# Patient Record
Sex: FEMALE | Race: OTHER | HISPANIC OR LATINO | Employment: FULL TIME | URBAN - METROPOLITAN AREA
[De-identification: names, ages, dates, MRNs, and addresses within clinical notes are randomized per-mention and may not be internally consistent; named-entity substitution may affect disease eponyms.]

---

## 2017-03-29 ENCOUNTER — ALLSCRIPTS OFFICE VISIT (OUTPATIENT)
Dept: OTHER | Facility: OTHER | Age: 47
End: 2017-03-29

## 2017-03-29 ENCOUNTER — LAB REQUISITION (OUTPATIENT)
Dept: LAB | Facility: HOSPITAL | Age: 47
End: 2017-03-29

## 2017-03-29 DIAGNOSIS — Z01.419 ENCOUNTER FOR GYNECOLOGICAL EXAMINATION WITHOUT ABNORMAL FINDING: ICD-10-CM

## 2017-03-29 DIAGNOSIS — Z12.31 ENCOUNTER FOR SCREENING MAMMOGRAM FOR MALIGNANT NEOPLASM OF BREAST: ICD-10-CM

## 2017-03-29 PROCEDURE — G0145 SCR C/V CYTO,THINLAYER,RESCR: HCPCS | Performed by: PHYSICIAN ASSISTANT

## 2017-03-29 PROCEDURE — 87624 HPV HI-RISK TYP POOLED RSLT: CPT | Performed by: PHYSICIAN ASSISTANT

## 2017-04-03 LAB — HPV RRNA GENITAL QL NAA+PROBE: NORMAL

## 2017-04-04 LAB
LAB AP GYN PRIMARY INTERPRETATION: NORMAL
Lab: NORMAL

## 2017-10-24 ENCOUNTER — ALLSCRIPTS OFFICE VISIT (OUTPATIENT)
Dept: OTHER | Facility: OTHER | Age: 47
End: 2017-10-24

## 2017-10-24 DIAGNOSIS — H53.8 OTHER VISUAL DISTURBANCES (CODE): ICD-10-CM

## 2017-10-24 DIAGNOSIS — Z13.220 ENCOUNTER FOR SCREENING FOR LIPOID DISORDERS: ICD-10-CM

## 2017-10-24 DIAGNOSIS — N64.4 MASTODYNIA: ICD-10-CM

## 2017-11-14 ENCOUNTER — APPOINTMENT (OUTPATIENT)
Dept: LAB | Facility: CLINIC | Age: 47
End: 2017-11-14
Payer: COMMERCIAL

## 2017-11-14 ENCOUNTER — TRANSCRIBE ORDERS (OUTPATIENT)
Dept: LAB | Facility: CLINIC | Age: 47
End: 2017-11-14

## 2017-11-14 ENCOUNTER — ALLSCRIPTS OFFICE VISIT (OUTPATIENT)
Dept: OTHER | Facility: OTHER | Age: 47
End: 2017-11-14

## 2017-11-14 DIAGNOSIS — Z13.220 ENCOUNTER FOR SCREENING FOR LIPOID DISORDERS: ICD-10-CM

## 2017-11-14 DIAGNOSIS — H53.8 OTHER VISUAL DISTURBANCES (CODE): ICD-10-CM

## 2017-11-14 DIAGNOSIS — N64.4 MASTODYNIA: ICD-10-CM

## 2017-11-14 LAB
ALBUMIN SERPL BCP-MCNC: 3.5 G/DL (ref 3.5–5)
ALP SERPL-CCNC: 65 U/L (ref 46–116)
ALT SERPL W P-5'-P-CCNC: 37 U/L (ref 12–78)
ANION GAP SERPL CALCULATED.3IONS-SCNC: 6 MMOL/L (ref 4–13)
AST SERPL W P-5'-P-CCNC: 18 U/L (ref 5–45)
BASOPHILS # BLD AUTO: 0.05 THOUSANDS/ΜL (ref 0–0.1)
BASOPHILS NFR BLD AUTO: 1 % (ref 0–1)
BILIRUB SERPL-MCNC: 0.63 MG/DL (ref 0.2–1)
BUN SERPL-MCNC: 12 MG/DL (ref 5–25)
CALCIUM SERPL-MCNC: 8.7 MG/DL (ref 8.3–10.1)
CHLORIDE SERPL-SCNC: 102 MMOL/L (ref 100–108)
CHOLEST SERPL-MCNC: 187 MG/DL (ref 50–200)
CO2 SERPL-SCNC: 28 MMOL/L (ref 21–32)
CREAT SERPL-MCNC: 0.56 MG/DL (ref 0.6–1.3)
EOSINOPHIL # BLD AUTO: 0.4 THOUSAND/ΜL (ref 0–0.61)
EOSINOPHIL NFR BLD AUTO: 5 % (ref 0–6)
ERYTHROCYTE [DISTWIDTH] IN BLOOD BY AUTOMATED COUNT: 13.2 % (ref 11.6–15.1)
EST. AVERAGE GLUCOSE BLD GHB EST-MCNC: 100 MG/DL
GFR SERPL CREATININE-BSD FRML MDRD: 111 ML/MIN/1.73SQ M
GLUCOSE P FAST SERPL-MCNC: 81 MG/DL (ref 65–99)
HBA1C MFR BLD: 5.1 % (ref 4.2–6.3)
HCT VFR BLD AUTO: 40.2 % (ref 34.8–46.1)
HDLC SERPL-MCNC: 73 MG/DL (ref 40–60)
HGB BLD-MCNC: 13.5 G/DL (ref 11.5–15.4)
LDLC SERPL CALC-MCNC: 80 MG/DL (ref 0–100)
LYMPHOCYTES # BLD AUTO: 1.91 THOUSANDS/ΜL (ref 0.6–4.47)
LYMPHOCYTES NFR BLD AUTO: 26 % (ref 14–44)
MCH RBC QN AUTO: 30.8 PG (ref 26.8–34.3)
MCHC RBC AUTO-ENTMCNC: 33.6 G/DL (ref 31.4–37.4)
MCV RBC AUTO: 92 FL (ref 82–98)
MONOCYTES # BLD AUTO: 0.49 THOUSAND/ΜL (ref 0.17–1.22)
MONOCYTES NFR BLD AUTO: 7 % (ref 4–12)
NEUTROPHILS # BLD AUTO: 4.54 THOUSANDS/ΜL (ref 1.85–7.62)
NEUTS SEG NFR BLD AUTO: 61 % (ref 43–75)
NRBC BLD AUTO-RTO: 0 /100 WBCS
PLATELET # BLD AUTO: 207 THOUSANDS/UL (ref 149–390)
PMV BLD AUTO: 11.1 FL (ref 8.9–12.7)
POTASSIUM SERPL-SCNC: 4.1 MMOL/L (ref 3.5–5.3)
PROT SERPL-MCNC: 7.2 G/DL (ref 6.4–8.2)
RBC # BLD AUTO: 4.39 MILLION/UL (ref 3.81–5.12)
SODIUM SERPL-SCNC: 136 MMOL/L (ref 136–145)
TRIGL SERPL-MCNC: 172 MG/DL
TSH SERPL DL<=0.05 MIU/L-ACNC: 3.49 UIU/ML (ref 0.36–3.74)
WBC # BLD AUTO: 7.42 THOUSAND/UL (ref 4.31–10.16)

## 2017-11-14 PROCEDURE — 84443 ASSAY THYROID STIM HORMONE: CPT

## 2017-11-14 PROCEDURE — 83036 HEMOGLOBIN GLYCOSYLATED A1C: CPT

## 2017-11-14 PROCEDURE — 80061 LIPID PANEL: CPT

## 2017-11-14 PROCEDURE — 36415 COLL VENOUS BLD VENIPUNCTURE: CPT

## 2017-11-14 PROCEDURE — 85025 COMPLETE CBC W/AUTO DIFF WBC: CPT

## 2017-11-14 PROCEDURE — 80053 COMPREHEN METABOLIC PANEL: CPT

## 2017-11-16 ENCOUNTER — GENERIC CONVERSION - ENCOUNTER (OUTPATIENT)
Dept: OTHER | Facility: OTHER | Age: 47
End: 2017-11-16

## 2017-12-20 ENCOUNTER — HOSPITAL ENCOUNTER (OUTPATIENT)
Dept: MAMMOGRAPHY | Facility: CLINIC | Age: 47
Discharge: HOME | End: 2017-12-20
Payer: COMMERCIAL

## 2017-12-20 ENCOUNTER — GENERIC CONVERSION - ENCOUNTER (OUTPATIENT)
Dept: OTHER | Facility: OTHER | Age: 47
End: 2017-12-20

## 2017-12-20 ENCOUNTER — HOSPITAL ENCOUNTER (OUTPATIENT)
Dept: ULTRASOUND IMAGING | Facility: CLINIC | Age: 47
Discharge: HOME | End: 2017-12-20
Payer: COMMERCIAL

## 2017-12-20 DIAGNOSIS — N64.4 BREAST PAIN: ICD-10-CM

## 2017-12-20 DIAGNOSIS — N64.4 MASTODYNIA: ICD-10-CM

## 2017-12-20 PROCEDURE — G0279 TOMOSYNTHESIS, MAMMO: HCPCS

## 2017-12-20 PROCEDURE — G0204 DX MAMMO INCL CAD BI: HCPCS

## 2017-12-21 ENCOUNTER — GENERIC CONVERSION - ENCOUNTER (OUTPATIENT)
Dept: OTHER | Facility: OTHER | Age: 47
End: 2017-12-21

## 2017-12-21 DIAGNOSIS — R92.8 OTHER ABNORMAL AND INCONCLUSIVE FINDINGS ON DIAGNOSTIC IMAGING OF BREAST: ICD-10-CM

## 2017-12-22 ENCOUNTER — GENERIC CONVERSION - ENCOUNTER (OUTPATIENT)
Dept: OTHER | Facility: OTHER | Age: 47
End: 2017-12-22

## 2017-12-27 ENCOUNTER — HOSPITAL ENCOUNTER (OUTPATIENT)
Dept: ULTRASOUND IMAGING | Facility: CLINIC | Age: 47
Discharge: HOME | End: 2017-12-27
Payer: COMMERCIAL

## 2017-12-27 DIAGNOSIS — R92.8 OTHER ABNORMAL AND INCONCLUSIVE FINDINGS ON DIAGNOSTIC IMAGING OF BREAST: ICD-10-CM

## 2017-12-27 PROCEDURE — 76642 ULTRASOUND BREAST LIMITED: CPT

## 2017-12-28 ENCOUNTER — GENERIC CONVERSION - ENCOUNTER (OUTPATIENT)
Dept: OTHER | Facility: OTHER | Age: 47
End: 2017-12-28

## 2018-01-10 NOTE — PROGRESS NOTES
Assessment    1  Well adult on routine health check (V70 0) (Z00 00)    Plan  Well adult on routine health check    · Follow-up visit in 1 year Evaluation and Treatment  Follow-up  Status: Hold For -  Scheduling  Requested for: [id number]    Discussion/Summary  health maintenance visit Currently, she eats a healthy diet  cervical cancer screening is current Breast cancer screening: Patient states that she does have the requisition for mammogram  Encouraged to do so since there is a family history with an aunt with breast cancer  Patient discussion: discussed with the patient  Form for [employer] completed and photocopied  Encouraged to have her mammogram ordered by gynecology  She is proceeding with fasting blood work today ordered previously  Follow-up if abdominal pain ( only on exam) persist    The patient was counseled regarding instructions for management, impressions  The treatment plan was reviewed with the patient/guardian  The patient/guardian understands and agrees with the treatment plan     Self Referrals: No      Chief Complaint  pt here today for a physical for work      History of Present Illness  HM, Adult Female: The patient is being seen for a health maintenance and Patient states she needs a routine physical for preemployment for [employer]  Applying for a  position evaluation  General Health: The patient's health since the last visit is described as good  She has regular dental visits  The patient reports her last dental visit was 2017  She denies vision problems  She denies hearing loss  Lifestyle:  She consumes a diverse and healthy diet  She does not have any weight concerns  She does not exercise regularly  She does not use tobacco  She denies alcohol use  She denies drug use  Reproductive health:  she reports normal menses  saw gyne 3/2017     Screening:      Review of Systems    Constitutional: No fever, no chills, feels well, no tiredness, no recent weight gain or weight loss    ENT: no complaints of earache, no loss of hearing, no nose bleeds, no nasal discharge, no sore throat, no hoarseness  Cardiovascular: No complaints of slow heart rate, no fast heart rate, no chest pain, no palpitations, no leg claudication, no lower extremity edema  Respiratory: No complaints of shortness of breath, no wheezing, no cough, no SOB on exertion, no orthopnea, no PND  Gastrointestinal: no abdominal pain, no nausea and no vomiting  Active Problems    1  Blurred vision (368 8) (H53 8)   2  Contraception management (V25 9) (Z30 9)   3  Diverticulitis of colon (562 11) (K57 32)   4  Encounter for management and injection of depo-Provera (V25 49) (Z30 42)   5  Encounter for routine gynecological examination (V72 31) (Z01 419)   6  History of spontaneous  (V13 29) (Z87 59)   7  Lipid screening (V77 91) (Z13 220)   8  Nexplanon removal (V25 43) (Z30 46)   9  Pain of left breast (611 71) (N64 4)   10  Pap test, as part of routine gynecological examination (V76 2) (Z01 419)   11  Visit for screening mammogram (V76 12) (Z12 31)    Past Medical History    · History of spontaneous  (V13 29) (Z87 59)    Surgical History    · History of Exploratory Laparotomy    Family History  Mother    · Family history of Hypertension  Paternal Grandfather    · Family history of diabetes mellitus (V18 0) (Z83 3)   · Family history of malignant neoplasm of prostate (V16 42) (Z80 42)  Maternal Aunt    · Family history of diabetes mellitus (V18 0) (Z83 3)   · Family history of malignant neoplasm of breast (V16 3) (Z80 3)   · Family history of malignant neoplasm of prostate (V16 42) (Z80 45)  Family History    · Family history of diabetes mellitus (V18 0) (Z83 3)    Social History    · Denied: History of Alcohol use   · Denied: History of Drug use   · Never a smoker    Current Meds   1  Multivital TABS; Therapy: (Recorded:2017) to Recorded   2  Tylenol 325 MG Oral Tablet;    Therapy: (Recorded:26Mar2015) to Recorded    Allergies    1  No Known Drug Allergies    2  No Known Environmental Allergies   3  No Known Food Allergies    Vitals   Recorded: [id number] 08:08AM   Temperature 96 3 F, Tympanic   Heart Rate 92   Respiration 18   Systolic 628, RUE, Sitting   Diastolic 88, RUE, Sitting   Height 4 ft 11 5 in   Weight 133 lb 8 oz   BMI Calculated 26 51   BSA Calculated 1 56   O2 Saturation 99     Physical Exam    Constitutional   General appearance: No acute distress, well appearing and well nourished  Ears, Nose, Mouth, and Throat   External inspection of ears and nose: Normal     Otoscopic examination: Tympanic membranes translucent with normal light reflex  Canals patent without erythema  Lips, teeth, and gums: Normal, good dentition  Oropharynx: Normal with no erythema, edema, exudate or lesions  Neck   Neck: Supple, symmetric, trachea midline, no masses  Thyroid: Normal, no thyromegaly  Pulmonary   Respiratory effort: No increased work of breathing or signs of respiratory distress  Auscultation of lungs: Clear to auscultation  Cardiovascular   Auscultation of heart: Normal rate and rhythm, normal S1 and S2, no murmurs  Abdomen   Abdomen: Abnormal   Good bowel sounds, soft, mild tenderness of the upper quadrants the patient denies any pain with eating  Liver and spleen: No hepatomegaly or splenomegaly  Lymphatic   Palpation of lymph nodes in neck: No lymphadenopathy      Musculoskeletal   Gait and station: Normal        Signatures   Electronically signed by : LILY Coker; Nov 14 2017  8:26AM EST                       (Author)    Electronically signed by : KAR Wan ; Nov 14 2017 10:19AM EST

## 2018-01-12 VITALS
BODY MASS INDEX: 26.21 KG/M2 | WEIGHT: 133.5 LBS | RESPIRATION RATE: 18 BRPM | OXYGEN SATURATION: 99 % | HEART RATE: 92 BPM | SYSTOLIC BLOOD PRESSURE: 122 MMHG | TEMPERATURE: 96.3 F | HEIGHT: 60 IN | DIASTOLIC BLOOD PRESSURE: 88 MMHG

## 2018-01-15 VITALS
WEIGHT: 127.13 LBS | HEIGHT: 61 IN | BODY MASS INDEX: 24 KG/M2 | SYSTOLIC BLOOD PRESSURE: 118 MMHG | DIASTOLIC BLOOD PRESSURE: 81 MMHG

## 2018-01-22 VITALS
WEIGHT: 137 LBS | RESPIRATION RATE: 18 BRPM | SYSTOLIC BLOOD PRESSURE: 134 MMHG | TEMPERATURE: 98.5 F | HEART RATE: 90 BPM | HEIGHT: 60 IN | BODY MASS INDEX: 26.9 KG/M2 | DIASTOLIC BLOOD PRESSURE: 82 MMHG

## 2018-01-23 NOTE — RESULT NOTES
Verified Results  *US BREAST RIGHT LIMITED (DIAGNOSTIC) [id number] 07:52AM Slava Raygoza Order Number: [id number]    - Patient Instructions: To schedule this appointment, please contact Central Scheduling at [phone]  Test Name Result Flag Reference   US BREAST RIGHT LIMITED (Report)     Patient History:   Family history of breast cancer in maternal uncle  Patient has never smoked  Patient's BMI is 25 4  Reason for exam: clinical finding  Bilateral areas of PAIN being evaluated with ultrasound  Patient   could not stay for ultrasound at the time of recent diagnostic    mammogram  Indication was incorrectly dictated for palpable    abnormalities at the time of diagnostic mammography  US Breast Right Limited: December 27, 2017 - Check In #: [de-identified]   Standard views  Technologist: Radha Das RDMS   Prior study comparison: December 20, 2017, mammo diagnostic    bilateral W DBT and CAD performed at [address] [place] [address]  Targeted bilateral ultrasound demonstrates no evidence of    hypoechoic mass or architectural distortion to suggest    malignancy  No suspicious hypoechoic mass or architectural    distortion to suggest malignancy  US Breast Left Limited: December 27, 2017 - Check In #: [de-identified]   Standard views  Technologist: Radha Das RDMS     ACR BI-RADSï¾® Assessments: BiRad:1 - Negative (Overall)   Right breast Right Brst US: Right breast is BiRad:1 - negative  Left breast Left Brst US: Left breast is BiRad:1 - negative  Recommendation:   Routine screening mammogram of both breasts in 1 year  The patient is scheduled in a reminder system for screening    mammography       Transcription Location: [facility] 98: [id number]     Risk Value(s):   Tyrer-Cuzick 10 Year: 1 900%, Tyrer-Cuzick Lifetime: 9 200%,    Myriad Table: 1 5%, LEAH 5 Year: 0 6%, NCI Lifetime: 6 1%, MRS    : Based on personal and/or family history, consideration of hereditary risk assessment may be warranted  Signed by:   Raz Park MD   12/27/17     *US BREAST LEFT LIMITED (DIAGNOSTIC) [id number] 09:05AM Sheyla Sutton     Test Name Result Flag Reference   US BREAST LEFT LIMITED (Report)     Patient History:   Family history of breast cancer in maternal uncle  Patient has never smoked  Patient's BMI is 25 4  Reason for exam: clinical finding  Bilateral areas of PAIN being evaluated with ultrasound  Patient   could not stay for ultrasound at the time of recent diagnostic    mammogram  Indication was incorrectly dictated for palpable    abnormalities at the time of diagnostic mammography  US Breast Right Limited: December 27, 2017 - Check In #: [de-identified]   Standard views  Technologist: Pia Baeza RDMS   Prior study comparison: December 20, 2017, mammo diagnostic    bilateral W DBT and CAD performed at [address] [place] [address]  Targeted bilateral ultrasound demonstrates no evidence of    hypoechoic mass or architectural distortion to suggest    malignancy  No suspicious hypoechoic mass or architectural    distortion to suggest malignancy  US Breast Left Limited: December 27, 2017 - Check In #: [de-identified]   Standard views  Technologist: Pia Baeza RDMS     ACR BI-RADSï¾® Assessments: BiRad:1 - Negative (Overall)   Right breast Right Brst US: Right breast is BiRad:1 - negative  Left breast Left Brst US: Left breast is BiRad:1 - negative  Recommendation:   Routine screening mammogram of both breasts in 1 year  The patient is scheduled in a reminder system for screening    mammography       Transcription Location: [facility] 98: [id number]     Risk Value(s):   Tyrer-Cuzick 10 Year: 1 900%, Tyrer-Cuzick Lifetime: 9 200%,    Myriad Table: 1 5%, LEAH 5 Year: 0 6%, NCI Lifetime: 6 1%, MRS    : Based on personal and/or family history,    consideration of hereditary risk assessment may be warranted     Signed by:   Suki Long MD   12/27/17

## 2018-01-23 NOTE — RESULT NOTES
Verified Results  MAMMO DIAGNOSTIC BILATERAL W 3D & CAD [id number] 08:35AM Ernst Mandel Order Number: [id number]    - Patient Instructions: To schedule this appointment, please contact Central Scheduling at [phone]  Do not wear any perfume, powder, lotion or deodorant on breast or underarm area  Please bring your doctors order, referral (if needed) and insurance information with you on the day of the test  Failure to bring this information may result in this test being rescheduled  Arrive 15 minutes prior to your appointment time to register  On the day of your test, please bring any prior mammogram or breast studies with you that were not performed at a [facility]  Failure to bring prior exams may result in your test needing to be rescheduled  Test Name Result Flag Reference   [facility] DIAGNOSTIC BILATERAL W 3D & CAD (Report)     ADDENDUM:   Previous study from 2012 was made available to me  There is no    significant change in the dense parenchymal pattern  There is no    change to the findings or recommendations  Patient History:   Family history of breast cancer in maternal uncle  Patient has never smoked  Patient's BMI is 25 4  Reason for exam: clinical finding  Bilateral palpable abnormalities     Mammo Diagnostic Bilateral W DBT and CAD: December 20, 2017 -    Check In #: [de-identified]   2D/3D Procedure   3D views: Bilateral MLO view(s) were taken  2D views: Bilateral CC view(s) were taken  Technologist: MAYRA Estrada (R)(M)   The breast tissue is extremely dense, potentially limiting the    sensitivity of mammography  Patient risk, included in this    report, assists in determining the appropriate screening regimen    (such as 3-D mammography or the inclusion of automated breast    ultrasound or MRI)  3-D mammography may also remain indicated as    screening   These images were obtained using digital technique    and with the assistance of Computer Aided Detection  There are    no dominant masses, foci of architectural distortion or    suspicious clusters of calcification to suggest malignancy  The    visualized skin appears normal  The parenchymal pattern is dense    and nodular  Bilateral ultrasound over the palpable abnormalities will be    necessary for further evaluation  Patient could not stay for    ultrasound at this time  IMPRESSION:     ACR BI-RADSï¾® Assessments: BiRad:0 - Incomplete: needs additional    imaging evaluation     Recommendation:   Ultrasound and obtain prior study for comparison of both breasts  The patient is scheduled in a reminder system for screening    mammography  8-10% of cancers will be missed on mammography  Management of a    palpable abnormality must be based on clinical grounds  Patients   will be notified of their results via letter from our facility  Accredited by [employer] of [employer] and FDA  Transcription Location: [facility] 98: [id number]     Risk Value(s):   Tyrer-Cuzick 10 Year: 1 900%, Tyrer-Cuzick Lifetime: 9 200%,    Myriad Table: 1 5%, LEAH 5 Year: 0 6%, NCI Lifetime: 6 1%, MRS    : Based on personal and/or family history,    consideration of hereditary risk assessment may be warranted  Patient History:   Family history of breast cancer in maternal uncle  Patient has never smoked  Patient's BMI is 25 4  Reason for exam: clinical finding  Bilateral palpable abnormalities     Mammo Diagnostic Bilateral W DBT and CAD: December 20, 2017 -    Check In #: [de-identified]   2D/3D Procedure   3D views: Bilateral MLO view(s) were taken  2D views: Bilateral CC view(s) were taken  Technologist: MAYRA Kaur (R)(M)   The breast tissue is extremely dense, potentially limiting the    sensitivity of mammography   Patient risk, included in this    report, assists in determining the appropriate screening regimen    (such as 3-D mammography or the inclusion of automated breast    ultrasound or MRI)  3-D mammography may also remain indicated as    screening  These images were obtained using digital technique    and with the assistance of Computer Aided Detection  There are    no dominant masses, foci of architectural distortion or    suspicious clusters of calcification to suggest malignancy  The    visualized skin appears normal  The parenchymal pattern is dense    and nodular  Bilateral ultrasound over the palpable abnormalities will be    necessary for further evaluation  Patient could not stay for    ultrasound at this time  ACR BI-RADSï¾® Assessments: BiRad:0 - Incomplete: needs additional    imaging evaluation     Recommendation:   Ultrasound and obtain prior study for comparison of both breasts  The patient is scheduled in a reminder system for screening    mammography  8-10% of cancers will be missed on mammography  Management of a    palpable abnormality must be based on clinical grounds  Patients   will be notified of their results via letter from our facility  Accredited by [employer] and FDA  Transcription Location: [facility] 98: [id number]     Risk Value(s):   Tyrer-Cuzick 10 Year: 1 900%, Tyrer-Cuzick Lifetime: 9 200%,    Myriad Table: 1 5%, LEAH 5 Year: 0 6%, NCI Lifetime: 6 1%, MRS    : Based on personal and/or family history,    consideration of hereditary risk assessment may be warranted     Signed by:   Esperanza Sanders MD   12/20/17

## 2018-01-23 NOTE — RESULT NOTES
Verified Results  MAMMO DIAGNOSTIC BILATERAL W 3D & CAD [id number] 08:35AM Ester Le Order Number: [id number]    - Patient Instructions: To schedule this appointment, please contact Central Scheduling at [phone]  Do not wear any perfume, powder, lotion or deodorant on breast or underarm area  Please bring your doctors order, referral (if needed) and insurance information with you on the day of the test  Failure to bring this information may result in this test being rescheduled  Arrive 15 minutes prior to your appointment time to register  On the day of your test, please bring any prior mammogram or breast studies with you that were not performed at a [facility]  Failure to bring prior exams may result in your test needing to be rescheduled  Test Name Result Flag Reference   MAMMO DIAGNOSTIC BILATERAL W 3D & CAD (Report)     Patient History:   Family history of breast cancer in maternal uncle  Patient has never smoked  Patient's BMI is 25 4  Reason for exam: clinical finding  Bilateral palpable abnormalities     Mammo Diagnostic Bilateral W DBT and CAD: December 20, 2017 -    Check In #: [de-identified]   2D/3D Procedure   3D views: Bilateral MLO view(s) were taken  2D views: Bilateral CC view(s) were taken  Technologist: MAYRA Figueroa (MAYRA)(M)   The breast tissue is extremely dense, potentially limiting the    sensitivity of mammography  Patient risk, included in this    report, assists in determining the appropriate screening regimen    (such as 3-D mammography or the inclusion of automated breast    ultrasound or MRI)  3-D mammography may also remain indicated as    screening  These images were obtained using digital technique    and with the assistance of Computer Aided Detection  There are    no dominant masses, foci of architectural distortion or    suspicious clusters of calcification to suggest malignancy   The    visualized skin appears normal  The parenchymal pattern is dense    and nodular  Bilateral ultrasound over the palpable abnormalities will be    necessary for further evaluation  Patient could not stay for    ultrasound at this time  ACR BI-RADSï¾® Assessments: BiRad:0 - Incomplete: needs additional    imaging evaluation     Recommendation:   Ultrasound and obtain prior study for comparison of both breasts  The patient is scheduled in a reminder system for screening    mammography  8-10% of cancers will be missed on mammography  Management of a    palpable abnormality must be based on clinical grounds  Patients   will be notified of their results via letter from our facility  Accredited by [employer] and FDA  Transcription Location: [facility] 98: [id number]     Risk Value(s):   Tyrer-Cuzick 10 Year: 1 900%, Tyrer-Cuzick Lifetime: 9 200%,    Myriad Table: 1 5%, LEAH 5 Year: 0 6%, NCI Lifetime: 6 1%, MRS    : Based on personal and/or family history,    consideration of hereditary risk assessment may be warranted  Signed by:   Christy Mahoney MD   12/20/17       Plan  Abnormal mammogram of both breasts    · *US BREAST LEFT LIMITED (DIAGNOSTIC); Status:Hold For - Scheduling; Requested  for:[id number];    · *US BREAST RIGHT LIMITED (DIAGNOSTIC);  Status:Hold For - Scheduling; Requested  for:[id number];

## 2018-02-13 NOTE — RESULT NOTES
Verified Results  (1) CBC/PLT/DIFF [id number] 08:16AM Abhinav Marcos Order Number: [id number]     Test Name Result Flag Reference   WBC COUNT 7 42 Thousand/uL  4 31-10 16   RBC COUNT 4 39 Million/uL  3 81-5 12   HEMOGLOBIN 13 5 g/dL  11 5-15 4   HEMATOCRIT 40 2 %  34 8-46  1   MCV 92 fL  82-98   MCH 30 8 pg  26 8-34 3   MCHC 33 6 g/dL  31 4-37 4   RDW 13 2 %  11 6-15 1   MPV 11 1 fL  8 9-12 7   PLATELET COUNT 269 Thousands/uL  149-390   nRBC AUTOMATED 0 /100 WBCs     NEUTROPHILS RELATIVE PERCENT 61 %  43-75   LYMPHOCYTES RELATIVE PERCENT 26 %  14-44   MONOCYTES RELATIVE PERCENT 7 %  4-12   EOSINOPHILS RELATIVE PERCENT 5 %  0-6   BASOPHILS RELATIVE PERCENT 1 %  0-1   NEUTROPHILS ABSOLUTE COUNT 4 54 Thousands/? ??L  1 85-7 62   LYMPHOCYTES ABSOLUTE COUNT 1 91 Thousands/? ??L  0 60-4 47   MONOCYTES ABSOLUTE COUNT 0 49 Thousand/? ??L  0 17-1 22   EOSINOPHILS ABSOLUTE COUNT 0 40 Thousand/? ??L  0 00-0 61   BASOPHILS ABSOLUTE COUNT 0 05 Thousands/? ??L  0 00-0 10     (1) COMPREHENSIVE METABOLIC PANEL [id number] 59:31GS Abhinav Marcos Order Number: [id number]     Test Name Result Flag Reference   SODIUM 136 mmol/L  136-145   POTASSIUM 4 1 mmol/L  3 5-5 3   CHLORIDE 102 mmol/L  100-108   CARBON DIOXIDE 28 mmol/L  21-32   ANION GAP (CALC) 6 mmol/L  4-13   BLOOD UREA NITROGEN 12 mg/dL  5-25   CREATININE 0 56 mg/dL L 0 60-1 30   Standardized to IDMS reference method   CALCIUM 8 7 mg/dL  8 3-10 1   BILI, TOTAL 0 63 mg/dL  0 20-1 00   ALK PHOSPHATAS 65 U/L     ALT (SGPT) 37 U/L  12-78   Specimen collection should occur prior to Sulfasalazine and/or Sulfapyridine administration due to the potential for falsely depressed results  AST(SGOT) 18 U/L  5-45   Specimen collection should occur prior to Sulfasalazine administration due to the potential for falsely depressed results     ALBUMIN 3 5 g/dL  3 5-5 0   TOTAL PROTEIN 7 2 g/dL  6 4-8 2   eGFR 111 ml/min/1 73sq m     National Kidney Disease Education Program recommendations are as follows:  GFR calculation is accurate only with a steady state creatinine  Chronic Kidney disease less than 60 ml/min/1 73 sq  meters  Kidney failure less than 15 ml/min/1 73 sq  meters  GLUCOSE FASTING 81 mg/dL  65-99   Specimen collection should occur prior to Sulfasalazine administration due to the potential for falsely depressed results  Specimen collection should occur prior to Sulfapyridine administration due to the potential for falsely elevated results  (1) LIPID PANEL, FASTING [id number] 08:16AM [facility] Order Number: [id number]     Test Name Result Flag Reference   CHOLESTEROL 187 mg/dL     HDL,DIRECT 73 mg/dL H 40-60   Specimen collection should occur prior to Metamizole administration due to the potential for falsley depressed results  LDL CHOLESTEROL CALCULATED 80 mg/dL  0-100   Triglyceride:        Normal <150 mg/dl   Borderline High 150-199 mg/dl   High 200-499 mg/dl   Very High >499 mg/dl      Cholesterol:       Desirable <200 mg/dl    Borderline High 200-239 mg/dl    High >239 mg/dl      HDL Cholesterol:       High>59 mg/dL    Low <41 mg/dL      This screening LDL is a calculated result  It does not have the accuracy of the Direct Measured LDL in the monitoring of patients with hyperlipidemia and/or statin therapy  Direct Measure LDL (BQW748) must be ordered separately in these patients  TRIGLYCERIDES 172 mg/dL H <=150   Specimen collection should occur prior to N-Acetylcysteine or Metamizole administration due to the potential for falsely depressed results  (1) TSH WITH FT4 REFLEX [id number] 08:16AM [facility] Order Number: [id number]     Test Name Result Flag Reference   TSH 3 490 uIU/mL  0 358-3 740   Patients undergoing fluorescein dye angiography may retain small amounts of fluorescein in the body for 48-72 hours post procedure  Samples containing fluorescein can produce falsely depressed TSH values   If the patient had this procedure,a specimen should be resubmitted post fluorescein clearance  The recommended reference ranges for TSH during pregnancy are as follows:  First trimester 0 1 to 2 5 uIU/mL  Second trimester  0 2 to 3 0 uIU/mL  Third trimester 0 3 to 3 0 uIU/m     (1) HEMOGLOBIN A1C [id number] 08:16AM [facility] Order Number: [id number]     Test Name Result Flag Reference   HEMOGLOBIN A1C 5 1 %  4 2-6 3   EST  AVG   GLUCOSE 100 mg/dl

## 2018-11-13 PROBLEM — R92.8 ABNORMAL MAMMOGRAM OF BOTH BREASTS: Status: ACTIVE | Noted: 2017-12-21

## 2018-11-13 PROBLEM — K57.30 DIVERTICULOSIS OF LARGE INTESTINE: Status: ACTIVE | Noted: 2018-11-13

## 2018-11-13 PROBLEM — N64.4 PAIN OF LEFT BREAST: Status: ACTIVE | Noted: 2017-10-24

## 2018-11-13 PROBLEM — K57.32 DIVERTICULITIS OF COLON: Status: ACTIVE | Noted: 2017-10-24

## 2018-11-13 PROBLEM — K57.32 DIVERTICULITIS OF COLON: Status: RESOLVED | Noted: 2017-10-24 | Resolved: 2018-11-13

## 2018-11-13 PROBLEM — Z87.19 HISTORY OF DIVERTICULITIS: Status: ACTIVE | Noted: 2018-11-13

## 2018-12-07 PROBLEM — Z00.00 WELL ADULT EXAM: Status: ACTIVE | Noted: 2018-12-07

## 2018-12-11 ENCOUNTER — OFFICE VISIT (OUTPATIENT)
Dept: FAMILY MEDICINE CLINIC | Facility: CLINIC | Age: 48
End: 2018-12-11
Payer: COMMERCIAL

## 2018-12-11 VITALS
BODY MASS INDEX: 26.81 KG/M2 | WEIGHT: 136.56 LBS | RESPIRATION RATE: 18 BRPM | OXYGEN SATURATION: 99 % | TEMPERATURE: 97.6 F | HEIGHT: 60 IN | HEART RATE: 76 BPM | DIASTOLIC BLOOD PRESSURE: 78 MMHG | SYSTOLIC BLOOD PRESSURE: 128 MMHG

## 2018-12-11 DIAGNOSIS — J06.9 ACUTE UPPER RESPIRATORY INFECTION: Primary | ICD-10-CM

## 2018-12-11 DIAGNOSIS — E78.1 PRIMARY HYPERTRIGLYCERIDEMIA: ICD-10-CM

## 2018-12-11 PROCEDURE — 3008F BODY MASS INDEX DOCD: CPT | Performed by: PHYSICIAN ASSISTANT

## 2018-12-11 PROCEDURE — 99051 MED SERV EVE/WKEND/HOLIDAY: CPT | Performed by: PHYSICIAN ASSISTANT

## 2018-12-11 PROCEDURE — 1036F TOBACCO NON-USER: CPT | Performed by: PHYSICIAN ASSISTANT

## 2018-12-11 PROCEDURE — 99214 OFFICE O/P EST MOD 30 MIN: CPT | Performed by: PHYSICIAN ASSISTANT

## 2018-12-11 RX ORDER — PROMETHAZINE HYDROCHLORIDE AND CODEINE PHOSPHATE 6.25; 1 MG/5ML; MG/5ML
5 SYRUP ORAL EVERY 4 HOURS PRN
Qty: 120 ML | Refills: 0 | Status: SHIPPED | OUTPATIENT
Start: 2018-12-11 | End: 2019-10-16 | Stop reason: ALTCHOICE

## 2018-12-11 NOTE — PROGRESS NOTES
Assessment/Plan:    Patient Instructions   Recommend promethazine with codeine 1 tsp every 4-6 hours as needed but avoid the medication of working or driving because the drowsy side effects  Increase clear liquids  Follow-up if symptoms are not improved over the next week or the increase    Proceed with fasting lipid panel and CMP  Routine follow-up pending lab results    M*M[employer] software was used to dictate this note  It may contain errors with dictating incorrect words/spelling  Please contact provider directly for any questions  Diagnoses and all orders for this visit:    Acute upper respiratory infection  -     promethazine-codeine (PHENERGAN WITH CODEINE) 6 25-10 mg/5 mL syrup; Take 5 mL by mouth every 4 (four) hours as needed for cough    Primary hypertriglyceridemia  -     Comprehensive metabolic panel  -     Lipid panel          Subjective:      Patient ID: Yasmeen Moura is a 51 y o  female  Patient presents today for a routine evaluation  She states that she has had some nasal congestion and a cough over the past 2 days  The cough is keeping her awake at night  Denies any known fevers or chills  She has only been drinking tea for her symptoms  She has not been taking any cold supplements  She states that she has been taking ibuprofen also as needed for her headache  She has no other concerns at this time  She states her last Pap smear was a year ago  The following portions of the patient's history were reviewed and updated as appropriate:   She  has a past medical history of Spontaneous     She   Patient Active Problem List    Diagnosis Date Noted    Acute upper respiratory infection 2018    Primary hypertriglyceridemia 2018    Well adult exam 2018    Diverticulosis of large intestine 2018    History of diverticulitis 2018    Abnormal mammogram of both breasts 2017    Pain of left breast 10/24/2017     She  has a past surgical history that includes Exploratory laparotomy  Her family history includes Breast cancer in her maternal aunt; Diabetes in her maternal aunt and paternal grandfather; Hypertension in her mother; Prostate cancer in her paternal grandfather  She  reports that she has never smoked  She has never used smokeless tobacco  She reports that she does not drink alcohol or use drugs  Current Outpatient Prescriptions   Medication Sig Dispense Refill    acetaminophen (TYLENOL) 325 mg tablet Take by mouth      Multiple Vitamins-Minerals (MULTIVITAL-M PO) Take by mouth      promethazine-codeine (PHENERGAN WITH CODEINE) 6 25-10 mg/5 mL syrup Take 5 mL by mouth every 4 (four) hours as needed for cough 120 mL 0     No current facility-administered medications for this visit  Current Outpatient Prescriptions on File Prior to Visit   Medication Sig    acetaminophen (TYLENOL) 325 mg tablet Take by mouth    Multiple Vitamins-Minerals (MULTIVITAL-M PO) Take by mouth     No current facility-administered medications on file prior to visit  She has No Known Allergies       Review of Systems   Constitutional: Negative for chills and fever  HENT: Positive for congestion  Respiratory: Positive for cough  Neurological: Positive for headaches  Psychiatric/Behavioral: Positive for sleep disturbance  Objective:      /78 (BP Location: Right arm, Patient Position: Sitting, Cuff Size: Standard)   Pulse 76   Temp 97 6 °F (36 4 °C) (Tympanic)   Resp 18   Ht 4' 11 5" (1 511 m)   Wt 61 9 kg (136 lb 9 oz)   LMP 1969   SpO2 99%   BMI 27 12 kg/m²          Physical Exam   Constitutional: She appears well-developed and well-nourished  No distress  HENT:   Head: Normocephalic and atraumatic  Right Ear: External ear normal    Left Ear: External ear normal    Mouth/Throat: Oropharynx is clear and moist    Eyes: Pupils are equal, round, and reactive to light  Neck: Neck supple  No thyromegaly present  Cardiovascular: Normal rate, regular rhythm and normal heart sounds  Exam reveals no gallop and no friction rub  No murmur heard  Pulmonary/Chest: Effort normal and breath sounds normal  No respiratory distress  She has no wheezes  She has no rales  Abdominal: Soft  Bowel sounds are normal  She exhibits no mass  There is no tenderness  Musculoskeletal: Normal range of motion  She exhibits no deformity  Lymphadenopathy:     She has no cervical adenopathy  Neurological: She is alert  Skin: Skin is warm  Psychiatric: She has a normal mood and affect

## 2018-12-11 NOTE — PATIENT INSTRUCTIONS
Recommend promethazine with codeine 1 tsp every 4-6 hours as needed but avoid the medication of working or driving because the drowsy side effects    Increase clear liquids  Follow-up if symptoms are not improved over the next week or the increase    Proceed with fasting lipid panel and CMP  Routine follow-up pending lab results

## 2019-01-09 ENCOUNTER — APPOINTMENT (OUTPATIENT)
Dept: LAB | Facility: CLINIC | Age: 49
End: 2019-01-09
Payer: COMMERCIAL

## 2019-01-09 ENCOUNTER — TRANSCRIBE ORDERS (OUTPATIENT)
Dept: LAB | Facility: CLINIC | Age: 49
End: 2019-01-09

## 2019-01-09 LAB
ALBUMIN SERPL BCP-MCNC: 3.7 G/DL (ref 3.5–5)
ALP SERPL-CCNC: 63 U/L (ref 46–116)
ALT SERPL W P-5'-P-CCNC: 34 U/L (ref 12–78)
ANION GAP SERPL CALCULATED.3IONS-SCNC: 5 MMOL/L (ref 4–13)
AST SERPL W P-5'-P-CCNC: 16 U/L (ref 5–45)
BILIRUB SERPL-MCNC: 0.53 MG/DL (ref 0.2–1)
BUN SERPL-MCNC: 15 MG/DL (ref 5–25)
CALCIUM SERPL-MCNC: 8.5 MG/DL (ref 8.3–10.1)
CHLORIDE SERPL-SCNC: 103 MMOL/L (ref 100–108)
CHOLEST SERPL-MCNC: 227 MG/DL (ref 50–200)
CO2 SERPL-SCNC: 26 MMOL/L (ref 21–32)
CREAT SERPL-MCNC: 0.6 MG/DL (ref 0.6–1.3)
GFR SERPL CREATININE-BSD FRML MDRD: 107 ML/MIN/1.73SQ M
GLUCOSE P FAST SERPL-MCNC: 86 MG/DL (ref 65–99)
HDLC SERPL-MCNC: 82 MG/DL (ref 40–60)
LDLC SERPL CALC-MCNC: 108 MG/DL (ref 0–100)
NONHDLC SERPL-MCNC: 145 MG/DL
POTASSIUM SERPL-SCNC: 4.2 MMOL/L (ref 3.5–5.3)
PROT SERPL-MCNC: 7.4 G/DL (ref 6.4–8.2)
SODIUM SERPL-SCNC: 134 MMOL/L (ref 136–145)
TRIGL SERPL-MCNC: 186 MG/DL

## 2019-01-09 PROCEDURE — 80053 COMPREHEN METABOLIC PANEL: CPT | Performed by: PHYSICIAN ASSISTANT

## 2019-01-09 PROCEDURE — 36415 COLL VENOUS BLD VENIPUNCTURE: CPT | Performed by: PHYSICIAN ASSISTANT

## 2019-01-09 PROCEDURE — 80061 LIPID PANEL: CPT | Performed by: PHYSICIAN ASSISTANT

## 2019-01-11 ENCOUNTER — TELEPHONE (OUTPATIENT)
Dept: FAMILY MEDICINE CLINIC | Facility: CLINIC | Age: 49
End: 2019-01-11

## 2019-01-11 DIAGNOSIS — Z12.39 BREAST CANCER SCREENING: Primary | ICD-10-CM

## 2019-01-16 ENCOUNTER — OFFICE VISIT (OUTPATIENT)
Dept: OBGYN CLINIC | Facility: CLINIC | Age: 49
End: 2019-01-16

## 2019-01-16 VITALS
HEIGHT: 60 IN | DIASTOLIC BLOOD PRESSURE: 83 MMHG | BODY MASS INDEX: 25.91 KG/M2 | WEIGHT: 132 LBS | SYSTOLIC BLOOD PRESSURE: 128 MMHG | HEART RATE: 72 BPM

## 2019-01-16 DIAGNOSIS — Z12.39 BREAST CANCER SCREENING: Primary | ICD-10-CM

## 2019-01-16 DIAGNOSIS — Z00.00 WELL ADULT EXAM: ICD-10-CM

## 2019-01-16 PROCEDURE — 99214 OFFICE O/P EST MOD 30 MIN: CPT | Performed by: OBSTETRICS & GYNECOLOGY

## 2019-01-16 NOTE — PROGRESS NOTES
Assessment/Plan:      Diagnoses and all orders for this visit:    Breast cancer screening  Mammogram ordered, by PCP, f u results     Well adult exam  Up to date pap w/ cotesting WNL in 2017  Normal pelvic exam today   Dyslipidemia being treated with exercise and diet by PCP  Declines STD testing   Mammogram already ordered by PCP for this year, last year had bilateral breast US secondary to dense breast tissue, findings WNL  Taking OCPS for [facility], satisfied with it, will continue  Still having regular periods with LMP Dec 20th  Subjective:  Doing well, denies any complaints today  Patient ID: Kenya Saravia is a 51 y o  female  HPI  49yo female P3 here for annual exam  She has had good preventive care with her PCP  Patient denies any complaints today, she is sexually active with 1 male partner and uses OCPs for [facility]  She denies smoking , alcohol intake or drug use  Declines STD testing  LMP Dec 20th with regular cycles  She has mild dyslipidemia being treated with exercise and diet  Otherwise healthy  Review of Systems   Constitutional: Negative  HENT: Negative  Respiratory: Negative  Cardiovascular: Negative  Gastrointestinal: Negative  Genitourinary: Negative  Musculoskeletal: Negative  All other systems reviewed and are negative  Objective:     Physical Exam   Constitutional: She appears well-developed and well-nourished  No distress  HENT:   Head: Normocephalic and atraumatic  Neck: Normal range of motion  Neck supple  No JVD present  No thyromegaly present  Cardiovascular: Normal rate, regular rhythm and normal heart sounds  Exam reveals no gallop  No murmur heard  Pulmonary/Chest: Effort normal and breath sounds normal  No respiratory distress  She has no wheezes  She has no rales  Abdominal: Soft  Bowel sounds are normal  She exhibits no distension and no mass  There is no tenderness  There is no rebound and no guarding     Genitourinary: Vagina normal and uterus normal    Genitourinary Comments: Normal appearing cervix no masses observed, normal sized anteverted uterus, no adnexal masses palpated  Skin: She is not diaphoretic         /83   Pulse 72   Ht 5' (1 524 m)   Wt 59 9 kg (132 lb)   LMP 12/22/2018 (Approximate)   BMI 25 78 kg/m²

## 2019-10-16 ENCOUNTER — OFFICE VISIT (OUTPATIENT)
Dept: OBGYN CLINIC | Facility: CLINIC | Age: 49
End: 2019-10-16

## 2019-10-16 VITALS
HEART RATE: 81 BPM | SYSTOLIC BLOOD PRESSURE: 116 MMHG | HEIGHT: 60 IN | BODY MASS INDEX: 26.31 KG/M2 | WEIGHT: 134 LBS | DIASTOLIC BLOOD PRESSURE: 87 MMHG

## 2019-10-16 DIAGNOSIS — R10.2 PELVIC PAIN: Primary | ICD-10-CM

## 2019-10-16 LAB — SL AMB POCT URINE HCG: NEGATIVE

## 2019-10-16 PROCEDURE — 81025 URINE PREGNANCY TEST: CPT | Performed by: OBSTETRICS & GYNECOLOGY

## 2019-10-16 PROCEDURE — 99213 OFFICE O/P EST LOW 20 MIN: CPT | Performed by: OBSTETRICS & GYNECOLOGY

## 2019-10-16 RX ORDER — ACETAMINOPHEN 325 MG/1
650 TABLET ORAL EVERY 6 HOURS PRN
COMMUNITY
End: 2022-02-11 | Stop reason: ALTCHOICE

## 2019-10-16 NOTE — PROGRESS NOTES
Assessment/Plan:    No problem-specific Assessment & Plan notes found for this encounter  Diagnoses and all orders for this visit:  Pelvic pain  -  US pelvis complete non OB; Future  -  Relatively benign physical exam     - Negative pregnancy test  - Return to clinic for US results     Amenorrhea, Likely secondary to perimenopausal state        - Negative pregnancy test             Subjective:      Patient ID: Akua Lopez is a 47 y o  [id number] with LMP 7/26/19 who presents today with compliant of bilateral pelvic pain  She states that there pain first started felling lower abdominal pain 2 months agao  She describes the pain as crampy in nature and rates it anywhere from a 4-6  The pain is intermittent in nature and she notices it more in the morning  There are no alleviating or aggravating factors that she can recall  She feels that the pain has worsened over the past week  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  She has taken Motrin and Tylenol for the pain which does relieve her symptoms  She is concerned that she may be starting menopause because she has had other symptoms like headaches, feeling hot or cold and changes in her vision  She does not that she wears glasses  She is currently  from her partner and states that the last time she had intercourse was a month and a half ago  She does note pain with intercourse throughout the entire time  She denies any changes in bowel and bladder habits  No issues with constipation         The following portions of the patient's history were reviewed and updated as appropriate: allergies, current medications, past family history, past medical history, past social history, past surgical history and problem list     Review of Systems  As stated in HPI    Objective:      /87 (BP Location: Right arm, Patient Position: Sitting, Cuff Size: Standard)   Pulse 81   Ht 4' 11 5" (1 511 m)   Wt 60 8 kg (134 lb)   BMI 26 61 kg/m²          Physical Exam   Constitutional: She is oriented to person, place, and time  She appears well-developed and well-nourished  No distress  HENT:   Head: Normocephalic and atraumatic  Abdominal: Soft  There is tenderness  Genitourinary: No vaginal discharge found  Genitourinary Comments: Normal appearing cervix without lesions  No cervical motion tenderness  No masses appreciated  Tenderness to palpation on the right and midline  Musculoskeletal: She exhibits no tenderness  Neurological: She is alert and oriented to person, place, and time  Skin: She is not diaphoretic  Psychiatric: She has a normal mood and affect  Her behavior is normal    Vitals reviewed        Doug Cabrera MD, MPH  OB/GYN, PGY4  10/16/2019, 2:47 PM

## 2019-10-23 ENCOUNTER — TRANSCRIBE ORDERS (OUTPATIENT)
Dept: ADMISSIONS | Facility: HOSPITAL | Age: 49
End: 2019-10-23

## 2019-10-23 ENCOUNTER — HOSPITAL ENCOUNTER (OUTPATIENT)
Dept: RADIOLOGY | Facility: HOSPITAL | Age: 49
Discharge: HOME | End: 2019-10-23
Payer: COMMERCIAL

## 2019-10-23 DIAGNOSIS — R10.2 PELVIC PAIN: ICD-10-CM

## 2019-10-23 PROCEDURE — 76856 US EXAM PELVIC COMPLETE: CPT

## 2019-10-23 PROCEDURE — 76830 TRANSVAGINAL US NON-OB: CPT

## 2019-11-13 ENCOUNTER — HOSPITAL ENCOUNTER (OUTPATIENT)
Dept: RADIOLOGY | Facility: HOSPITAL | Age: 49
Discharge: HOME | End: 2019-11-13
Payer: COMMERCIAL

## 2019-11-13 VITALS — WEIGHT: 134 LBS | BODY MASS INDEX: 27.01 KG/M2 | HEIGHT: 59 IN

## 2019-11-13 DIAGNOSIS — Z12.39 BREAST CANCER SCREENING: ICD-10-CM

## 2019-11-13 PROCEDURE — 77067 SCR MAMMO BI INCL CAD: CPT

## 2020-12-19 ENCOUNTER — TRANSCRIBE ORDERS (OUTPATIENT)
Dept: LAB | Facility: HOSPITAL | Age: 50
End: 2020-12-19

## 2020-12-19 ENCOUNTER — APPOINTMENT (OUTPATIENT)
Dept: LAB | Facility: HOSPITAL | Age: 50
End: 2020-12-19
Payer: COMMERCIAL

## 2020-12-19 DIAGNOSIS — R53.83 FATIGUE, UNSPECIFIED TYPE: ICD-10-CM

## 2020-12-19 DIAGNOSIS — E55.9 VITAMIN D DEFICIENCY, UNSPECIFIED: ICD-10-CM

## 2020-12-19 DIAGNOSIS — Z00.01 ENCOUNTER FOR GENERAL ADULT MEDICAL EXAMINATION WITH ABNORMAL FINDINGS: Primary | ICD-10-CM

## 2020-12-19 DIAGNOSIS — Z00.01 ENCOUNTER FOR GENERAL ADULT MEDICAL EXAMINATION WITH ABNORMAL FINDINGS: ICD-10-CM

## 2020-12-19 LAB
25(OH)D3 SERPL-MCNC: 24.7 NG/ML (ref 30–100)
ALBUMIN SERPL BCP-MCNC: 3.7 G/DL (ref 3.5–5)
ALP SERPL-CCNC: 82 U/L (ref 46–116)
ALT SERPL W P-5'-P-CCNC: 35 U/L (ref 12–78)
ANION GAP SERPL CALCULATED.3IONS-SCNC: 5 MMOL/L (ref 4–13)
AST SERPL W P-5'-P-CCNC: 15 U/L (ref 5–45)
BASOPHILS # BLD AUTO: 0.04 THOUSANDS/ΜL (ref 0–0.1)
BASOPHILS NFR BLD AUTO: 1 % (ref 0–1)
BILIRUB SERPL-MCNC: 0.57 MG/DL (ref 0.2–1)
BUN SERPL-MCNC: 17 MG/DL (ref 5–25)
CALCIUM SERPL-MCNC: 8.8 MG/DL (ref 8.3–10.1)
CHLORIDE SERPL-SCNC: 109 MMOL/L (ref 100–108)
CHOLEST SERPL-MCNC: 182 MG/DL (ref 50–200)
CO2 SERPL-SCNC: 24 MMOL/L (ref 21–32)
CREAT SERPL-MCNC: 0.58 MG/DL (ref 0.6–1.3)
EOSINOPHIL # BLD AUTO: 0.18 THOUSAND/ΜL (ref 0–0.61)
EOSINOPHIL NFR BLD AUTO: 3 % (ref 0–6)
ERYTHROCYTE [DISTWIDTH] IN BLOOD BY AUTOMATED COUNT: 12.6 % (ref 11.6–15.1)
GFR SERPL CREATININE-BSD FRML MDRD: 107 ML/MIN/1.73SQ M
GLUCOSE P FAST SERPL-MCNC: 81 MG/DL (ref 65–99)
HCT VFR BLD AUTO: 40.7 % (ref 34.8–46.1)
HDLC SERPL-MCNC: 74 MG/DL
HGB BLD-MCNC: 13.5 G/DL (ref 11.5–15.4)
IMM GRANULOCYTES # BLD AUTO: 0.01 THOUSAND/UL (ref 0–0.2)
IMM GRANULOCYTES NFR BLD AUTO: 0 % (ref 0–2)
LDLC SERPL CALC-MCNC: 92 MG/DL (ref 0–100)
LYMPHOCYTES # BLD AUTO: 1.66 THOUSANDS/ΜL (ref 0.6–4.47)
LYMPHOCYTES NFR BLD AUTO: 26 % (ref 14–44)
MCH RBC QN AUTO: 30.8 PG (ref 26.8–34.3)
MCHC RBC AUTO-ENTMCNC: 33.2 G/DL (ref 31.4–37.4)
MCV RBC AUTO: 93 FL (ref 82–98)
MONOCYTES # BLD AUTO: 0.44 THOUSAND/ΜL (ref 0.17–1.22)
MONOCYTES NFR BLD AUTO: 7 % (ref 4–12)
NEUTROPHILS # BLD AUTO: 4.15 THOUSANDS/ΜL (ref 1.85–7.62)
NEUTS SEG NFR BLD AUTO: 63 % (ref 43–75)
NONHDLC SERPL-MCNC: 108 MG/DL
NRBC BLD AUTO-RTO: 0 /100 WBCS
PLATELET # BLD AUTO: 203 THOUSANDS/UL (ref 149–390)
PMV BLD AUTO: 11.1 FL (ref 8.9–12.7)
POTASSIUM SERPL-SCNC: 4 MMOL/L (ref 3.5–5.3)
PROT SERPL-MCNC: 7.2 G/DL (ref 6.4–8.2)
RBC # BLD AUTO: 4.38 MILLION/UL (ref 3.81–5.12)
SODIUM SERPL-SCNC: 138 MMOL/L (ref 136–145)
TRIGL SERPL-MCNC: 82 MG/DL
TSH SERPL DL<=0.05 MIU/L-ACNC: 1.55 UIU/ML (ref 0.36–3.74)
WBC # BLD AUTO: 6.48 THOUSAND/UL (ref 4.31–10.16)

## 2020-12-19 PROCEDURE — 80061 LIPID PANEL: CPT

## 2020-12-19 PROCEDURE — 85025 COMPLETE CBC W/AUTO DIFF WBC: CPT

## 2020-12-19 PROCEDURE — 84443 ASSAY THYROID STIM HORMONE: CPT

## 2020-12-19 PROCEDURE — 80053 COMPREHEN METABOLIC PANEL: CPT

## 2020-12-19 PROCEDURE — 82306 VITAMIN D 25 HYDROXY: CPT

## 2020-12-19 PROCEDURE — 36415 COLL VENOUS BLD VENIPUNCTURE: CPT

## 2022-01-04 PROCEDURE — U0003 INFECTIOUS AGENT DETECTION BY NUCLEIC ACID (DNA OR RNA); SEVERE ACUTE RESPIRATORY SYNDROME CORONAVIRUS 2 (SARS-COV-2) (CORONAVIRUS DISEASE [COVID-19]), AMPLIFIED PROBE TECHNIQUE, MAKING USE OF HIGH THROUGHPUT TECHNOLOGIES AS DESCRIBED BY CMS-2020-01-R: HCPCS | Performed by: FAMILY MEDICINE

## 2022-01-04 PROCEDURE — U0005 INFEC AGEN DETEC AMPLI PROBE: HCPCS | Performed by: FAMILY MEDICINE

## 2022-02-11 ENCOUNTER — APPOINTMENT (OUTPATIENT)
Dept: LAB | Facility: MEDICAL CENTER | Age: 52
End: 2022-02-11
Payer: COMMERCIAL

## 2022-02-11 ENCOUNTER — OFFICE VISIT (OUTPATIENT)
Dept: FAMILY MEDICINE CLINIC | Facility: MEDICAL CENTER | Age: 52
End: 2022-02-11
Payer: COMMERCIAL

## 2022-02-11 VITALS
BODY MASS INDEX: 25.13 KG/M2 | TEMPERATURE: 98.2 F | WEIGHT: 128 LBS | HEIGHT: 60 IN | HEART RATE: 64 BPM | DIASTOLIC BLOOD PRESSURE: 90 MMHG | SYSTOLIC BLOOD PRESSURE: 138 MMHG

## 2022-02-11 DIAGNOSIS — Z11.59 NEED FOR HEPATITIS C SCREENING TEST: ICD-10-CM

## 2022-02-11 DIAGNOSIS — Z12.31 SCREENING MAMMOGRAM FOR BREAST CANCER: ICD-10-CM

## 2022-02-11 DIAGNOSIS — Z11.4 ENCOUNTER FOR SCREENING FOR HIV: ICD-10-CM

## 2022-02-11 DIAGNOSIS — Z13.0 SCREENING FOR DISORDER OF BLOOD AND BLOOD-FORMING ORGANS: ICD-10-CM

## 2022-02-11 DIAGNOSIS — R20.0 NUMBNESS AND TINGLING IN BOTH HANDS: ICD-10-CM

## 2022-02-11 DIAGNOSIS — Z13.29 THYROID DISORDER SCREEN: ICD-10-CM

## 2022-02-11 DIAGNOSIS — Z13.220 LIPID SCREENING: ICD-10-CM

## 2022-02-11 DIAGNOSIS — Z12.11 COLON CANCER SCREENING: ICD-10-CM

## 2022-02-11 DIAGNOSIS — Z13.21 ENCOUNTER FOR VITAMIN DEFICIENCY SCREENING: ICD-10-CM

## 2022-02-11 DIAGNOSIS — Z76.89 ENCOUNTER TO ESTABLISH CARE WITH NEW DOCTOR: Primary | ICD-10-CM

## 2022-02-11 DIAGNOSIS — R07.9 INTERMITTENT CHEST PAIN: ICD-10-CM

## 2022-02-11 DIAGNOSIS — R20.2 NUMBNESS AND TINGLING IN BOTH HANDS: ICD-10-CM

## 2022-02-11 PROBLEM — J06.9 ACUTE UPPER RESPIRATORY INFECTION: Status: RESOLVED | Noted: 2018-12-11 | Resolved: 2022-02-11

## 2022-02-11 LAB
25(OH)D3 SERPL-MCNC: 19.9 NG/ML (ref 30–100)
ALBUMIN SERPL BCP-MCNC: 4 G/DL (ref 3.5–5)
ALP SERPL-CCNC: 82 U/L (ref 46–116)
ALT SERPL W P-5'-P-CCNC: 57 U/L (ref 12–78)
ANION GAP SERPL CALCULATED.3IONS-SCNC: 5 MMOL/L (ref 4–13)
AST SERPL W P-5'-P-CCNC: 32 U/L (ref 5–45)
BASOPHILS # BLD AUTO: 0.05 THOUSANDS/ΜL (ref 0–0.1)
BASOPHILS NFR BLD AUTO: 1 % (ref 0–1)
BILIRUB SERPL-MCNC: 0.59 MG/DL (ref 0.2–1)
BUN SERPL-MCNC: 12 MG/DL (ref 5–25)
CALCIUM SERPL-MCNC: 9.9 MG/DL (ref 8.3–10.1)
CHLORIDE SERPL-SCNC: 104 MMOL/L (ref 100–108)
CHOLEST SERPL-MCNC: 227 MG/DL
CO2 SERPL-SCNC: 27 MMOL/L (ref 21–32)
CREAT SERPL-MCNC: 0.6 MG/DL (ref 0.6–1.3)
EOSINOPHIL # BLD AUTO: 0.22 THOUSAND/ΜL (ref 0–0.61)
EOSINOPHIL NFR BLD AUTO: 4 % (ref 0–6)
ERYTHROCYTE [DISTWIDTH] IN BLOOD BY AUTOMATED COUNT: 12.8 % (ref 11.6–15.1)
GFR SERPL CREATININE-BSD FRML MDRD: 105 ML/MIN/1.73SQ M
GLUCOSE P FAST SERPL-MCNC: 85 MG/DL (ref 65–99)
HCT VFR BLD AUTO: 42.6 % (ref 34.8–46.1)
HCV AB SER QL: NORMAL
HDLC SERPL-MCNC: 77 MG/DL
HGB BLD-MCNC: 13.9 G/DL (ref 11.5–15.4)
IMM GRANULOCYTES # BLD AUTO: 0.01 THOUSAND/UL (ref 0–0.2)
IMM GRANULOCYTES NFR BLD AUTO: 0 % (ref 0–2)
LDLC SERPL CALC-MCNC: 118 MG/DL (ref 0–100)
LYMPHOCYTES # BLD AUTO: 1.85 THOUSANDS/ΜL (ref 0.6–4.47)
LYMPHOCYTES NFR BLD AUTO: 32 % (ref 14–44)
MCH RBC QN AUTO: 30.6 PG (ref 26.8–34.3)
MCHC RBC AUTO-ENTMCNC: 32.6 G/DL (ref 31.4–37.4)
MCV RBC AUTO: 94 FL (ref 82–98)
MONOCYTES # BLD AUTO: 0.37 THOUSAND/ΜL (ref 0.17–1.22)
MONOCYTES NFR BLD AUTO: 6 % (ref 4–12)
NEUTROPHILS # BLD AUTO: 3.25 THOUSANDS/ΜL (ref 1.85–7.62)
NEUTS SEG NFR BLD AUTO: 57 % (ref 43–75)
NONHDLC SERPL-MCNC: 150 MG/DL
NRBC BLD AUTO-RTO: 0 /100 WBCS
PLATELET # BLD AUTO: 193 THOUSANDS/UL (ref 149–390)
PMV BLD AUTO: 11.1 FL (ref 8.9–12.7)
POTASSIUM SERPL-SCNC: 3.8 MMOL/L (ref 3.5–5.3)
PROT SERPL-MCNC: 7.8 G/DL (ref 6.4–8.2)
RBC # BLD AUTO: 4.54 MILLION/UL (ref 3.81–5.12)
SODIUM SERPL-SCNC: 136 MMOL/L (ref 136–145)
TRIGL SERPL-MCNC: 158 MG/DL
TSH SERPL DL<=0.05 MIU/L-ACNC: 2.54 UIU/ML (ref 0.36–3.74)
WBC # BLD AUTO: 5.75 THOUSAND/UL (ref 4.31–10.16)

## 2022-02-11 PROCEDURE — 82306 VITAMIN D 25 HYDROXY: CPT

## 2022-02-11 PROCEDURE — 1036F TOBACCO NON-USER: CPT

## 2022-02-11 PROCEDURE — 86803 HEPATITIS C AB TEST: CPT

## 2022-02-11 PROCEDURE — 80053 COMPREHEN METABOLIC PANEL: CPT

## 2022-02-11 PROCEDURE — 3008F BODY MASS INDEX DOCD: CPT

## 2022-02-11 PROCEDURE — 80061 LIPID PANEL: CPT

## 2022-02-11 PROCEDURE — 99203 OFFICE O/P NEW LOW 30 MIN: CPT

## 2022-02-11 PROCEDURE — 3725F SCREEN DEPRESSION PERFORMED: CPT

## 2022-02-11 PROCEDURE — 85025 COMPLETE CBC W/AUTO DIFF WBC: CPT

## 2022-02-11 PROCEDURE — 93000 ELECTROCARDIOGRAM COMPLETE: CPT

## 2022-02-11 PROCEDURE — 36415 COLL VENOUS BLD VENIPUNCTURE: CPT

## 2022-02-11 PROCEDURE — 84443 ASSAY THYROID STIM HORMONE: CPT

## 2022-02-11 RX ORDER — CHOLECALCIFEROL (VITAMIN D3) 25 MCG
2 CAPSULE ORAL DAILY
COMMUNITY

## 2022-02-11 NOTE — PROGRESS NOTES
Assessment/Plan:    No problem-specific Assessment & Plan notes found for this encounter  Fasting blood work ordered  Colonoscopy ordered  Mammogram ordered  Patient will call for an appointment with her OBGYN doctor as she is due for regular visit/Pap  EMG ordered to rule out carpal tunnel syndrome  Patient to follow-up in 6 months for physical or sooner if needed  1  Encounter to establish care with new doctor  40-year-old female presents to establish care  Up-to-date on COVID vaccines with the exception of booster  Declined influenza vaccine at this time  Up-to-date on Tdap  2  Numbness and tingling in both hands  Patient reports bilateral hand numbness and tingling for 2-3 months worse on the right  - EMG 2 Limb Upper Extremity; Future    3  Intermittent chest pain  Reports intermittent chest pressure for 2-3 months, last time patient had this chest pain was approximately 1 week ago  - POCT ECG    4  Screening for disorder of blood and blood-forming organs  - CBC and differential; Future  - Comprehensive metabolic panel; Future    5  Encounter for vitamin deficiency screening  History of low vitamin-D, patient takes 1000 units daily  - Vitamin D 25 hydroxy; Future    6  Need for hepatitis C screening test  - Hepatitis C antibody; Future    7  Thyroid disorder screen  - TSH, 3rd generation with Free T4 reflex; Future    8  Lipid screening  - Lipid panel; Future    9  Colon cancer screening  - Ambulatory Referral to Gastroenterology; Future    10  Screening mammogram for breast cancer  - Mammo screening bilateral w 3d & cad; Future      Subjective:      Patient ID: Delia Rosa is a 45 y o  female  40-year-old pleasant female presents with her son, here to establish care  She is up today with COVID vaccine with the exception of booster #3  Tdap up-to-date  She does not wish to receive the influenza vaccine today    She reports bilateral hand numbness and tingling worse on the right for approximately 3 months  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  She does report intermittent  chest pressure approximately 2-3 months as well  Her last visit to her OBGYN doctor was in 2019  She is due for a colonoscopy and mammogram        The following portions of the patient's history were reviewed and updated as appropriate: allergies, current medications, past family history, past social history, past surgical history and problem list     Review of Systems   Constitutional: Positive for fatigue  Negative for activity change, chills, diaphoresis and fever  HENT: Negative for congestion, ear pain, facial swelling, nosebleeds, rhinorrhea, sinus pressure, sinus pain, sore throat, trouble swallowing and voice change  Eyes: Negative for photophobia, pain, discharge, redness and visual disturbance  Respiratory: Negative for cough, chest tightness, shortness of breath, wheezing and stridor  Cardiovascular: Positive for chest pain (intermittent pressure x2-3 months)  Negative for palpitations and leg swelling  Gastrointestinal: Negative for abdominal distention, abdominal pain, constipation, diarrhea, nausea and vomiting  Genitourinary: Negative for decreased urine volume, difficulty urinating, dysuria, flank pain, frequency, hematuria, urgency, vaginal bleeding, vaginal discharge and vaginal pain  Musculoskeletal: Negative for arthralgias, gait problem, joint swelling, neck pain and neck stiffness  Skin: Negative for color change, pallor, rash and wound  Neurological: Positive for numbness (intermittent, b/l hands worse on right)  Negative for dizziness, tremors, seizures, syncope, facial asymmetry, speech difficulty, weakness, light-headedness and headaches  Hematological: Negative for adenopathy  Psychiatric/Behavioral: Negative for agitation, behavioral problems and confusion  All other systems reviewed and are negative          Objective:      /90 (BP Location: Right arm, Patient Position: Sitting)   Pulse 64   Temp 98 2 °F (36 8 °C)   Ht 4' 11 5" (1 511 m)   Wt 58 1 kg (128 lb)   BMI 25 42 kg/m²          Physical Exam  Constitutional:       General: She is not in acute distress  Appearance: Normal appearance  She is not ill-appearing, toxic-appearing or diaphoretic  HENT:      Head: Normocephalic and atraumatic  Right Ear: Tympanic membrane normal       Left Ear: Tympanic membrane normal       Nose: Nose normal       Mouth/Throat:      Mouth: Mucous membranes are moist    Eyes:      Pupils: Pupils are equal, round, and reactive to light  Cardiovascular:      Rate and Rhythm: Normal rate and regular rhythm  Pulses: Normal pulses  Heart sounds: Normal heart sounds  No murmur heard  Pulmonary:      Effort: Pulmonary effort is normal  No respiratory distress  Breath sounds: Normal breath sounds  No stridor  No wheezing or rhonchi  Abdominal:      General: Abdomen is flat  Bowel sounds are normal  There is no distension  Palpations: Abdomen is soft  Tenderness: There is no abdominal tenderness  Musculoskeletal:         General: Normal range of motion  Cervical back: Normal range of motion and neck supple  Skin:     Capillary Refill: Capillary refill takes less than 2 seconds  Neurological:      General: No focal deficit present  Mental Status: She is alert and oriented to person, place, and time  Mental status is at baseline  Psychiatric:         Mood and Affect: Mood normal          Behavior: Behavior normal          Thought Content: Thought content normal            BMI Counseling: Body mass index is 25 42 kg/m²  The BMI is above normal  Nutrition recommendations include encouraging healthy choices of fruits and vegetables, consuming healthier snacks, moderation in carbohydrate intake and increasing intake of lean protein  Exercise recommendations include exercising 3-5 times per week   No pharmacotherapy was ordered  Rationale for BMI follow-up plan is due to patient being overweight or obese  Depression Screening and Follow-up Plan: Patient was screened for depression during today's encounter  They screened negative with a PHQ-2 score of 0              LAURITA Lowry

## 2022-02-11 NOTE — PATIENT INSTRUCTIONS

## 2022-03-29 ENCOUNTER — HOSPITAL ENCOUNTER (OUTPATIENT)
Dept: MAMMOGRAPHY | Facility: MEDICAL CENTER | Age: 52
Discharge: HOME | End: 2022-03-29
Payer: COMMERCIAL

## 2022-03-29 VITALS — WEIGHT: 128.09 LBS | HEIGHT: 60 IN | BODY MASS INDEX: 25.15 KG/M2

## 2022-03-29 DIAGNOSIS — Z12.31 SCREENING MAMMOGRAM FOR BREAST CANCER: ICD-10-CM

## 2022-03-29 PROCEDURE — 77067 SCR MAMMO BI INCL CAD: CPT

## 2022-03-29 PROCEDURE — 77063 BREAST TOMOSYNTHESIS BI: CPT

## 2022-07-14 ENCOUNTER — CONSULT (OUTPATIENT)
Dept: GASTROENTEROLOGY | Facility: AMBULARY SURGERY CENTER | Age: 52
End: 2022-07-14
Payer: COMMERCIAL

## 2022-07-14 VITALS
WEIGHT: 133.2 LBS | BODY MASS INDEX: 26.15 KG/M2 | OXYGEN SATURATION: 98 % | HEART RATE: 78 BPM | HEIGHT: 60 IN | DIASTOLIC BLOOD PRESSURE: 82 MMHG | SYSTOLIC BLOOD PRESSURE: 126 MMHG

## 2022-07-14 DIAGNOSIS — Z12.11 COLON CANCER SCREENING: Primary | ICD-10-CM

## 2022-07-14 DIAGNOSIS — K21.9 GASTROESOPHAGEAL REFLUX DISEASE WITHOUT ESOPHAGITIS: ICD-10-CM

## 2022-07-14 DIAGNOSIS — Z12.11 COLON CANCER SCREENING: ICD-10-CM

## 2022-07-14 PROCEDURE — 99244 OFF/OP CNSLTJ NEW/EST MOD 40: CPT | Performed by: INTERNAL MEDICINE

## 2022-07-14 RX ORDER — BISACODYL 5 MG/1
10 TABLET, DELAYED RELEASE ORAL ONCE
Qty: 2 TABLET | Refills: 0 | Status: SHIPPED | OUTPATIENT
Start: 2022-07-14 | End: 2022-08-03 | Stop reason: ALTCHOICE

## 2022-07-14 NOTE — H&P (VIEW-ONLY)
Consultation - [facility]  Matt Chen 1970 female         Chief Complaint:  For colonoscopy    HPI:  65-year-old female with no significant past medical history was referred for screening colonoscopy  Reports having problems with acid reflux at times but not on any medication  Regular bowel movements and denies any blood or mucus in the stool  Good appetite, no recent weight loss  No abdominal pain, nausea or vomiting  Chaperon: Ms Lian Lewis: Review of Systems   Constitutional: Negative for activity change, appetite change, chills, diaphoresis, fatigue, fever and unexpected weight change  HENT: Negative for ear discharge, ear pain, facial swelling, hearing loss, nosebleeds, sore throat, tinnitus and voice change  Eyes: Negative for pain, discharge, redness, itching and visual disturbance  Respiratory: Negative for apnea, cough, chest tightness, shortness of breath and wheezing  Cardiovascular: Negative for chest pain and palpitations  Gastrointestinal:        As noted in HPI   Endocrine: Negative for cold intolerance, heat intolerance and polyuria  Genitourinary: Negative for difficulty urinating, dysuria, flank pain, hematuria and urgency  Musculoskeletal: Negative for arthralgias, back pain, gait problem, joint swelling and myalgias  Skin: Negative for rash and wound  Neurological: Negative for dizziness, tremors, seizures, speech difficulty, light-headedness, numbness and headaches  Hematological: Negative for adenopathy  Does not bruise/bleed easily  Psychiatric/Behavioral: Negative for agitation, behavioral problems and confusion  The patient is not nervous/anxious           Past Medical History:   Diagnosis Date    Spontaneous      Last assessed 2017    Varicella       Past Surgical History:   Procedure Laterality Date    CHOLECYSTECTOMY      EXPLORATORY LAPAROTOMY       Social History     Socioeconomic History    Marital status: Legally      Spouse name: Not on file    Number of children: Not on file    Years of education: Not on file    Highest education level: Not on file   Occupational History    Not on file   Tobacco Use    Smoking status: Never Smoker    Smokeless tobacco: Never Used   Vaping Use    Vaping Use: Never used   Substance and Sexual Activity    Alcohol use: No     Comment:  Occ    Drug use: No     Comment: birthday only     Sexual activity: Not Currently     Partners: Male   Other Topics Concern    Not on file   Social History Narrative    Not on file     Social Determinants of Health     Financial Resource Strain: Not on file   Food Insecurity: Not on file   Transportation Needs: Not on file   Physical Activity: Not on file   Stress: Not on file   Social Connections: Not on file   Intimate Partner Violence: Not on file   Housing Stability: Not on file     Family History   Problem Relation Age of Onset    No Known Problems Mother     Diabetes Paternal [employer]i [employer]s     Prostate cancer Paternal Grandfather     Cancer Paternal Grandfather         prostate    Diabetes Maternal Aunt     Breast cancer Maternal Aunt     No Known Problems Father     No Known Problems Sister     No Known Problems Brother     No Known Problems Son     Brain cancer Paternal Uncle     No Known Problems Brother     No Known Problems Brother     No Known Problems Brother     No Known Problems Brother     No Known Problems Brother     No Known Problems Brother     No Known Problems Brother     No Known Problems Brother     No Known Problems Sister     No Known Problems Son     No Known Problems Son     No Known Problems Maternal Grandmother     No Known Problems Maternal Grandfather     No Known Problems Paternal Grandmother     No Known Problems Maternal Aunt     No Known Problems Maternal Aunt     No Known Problems Paternal Aunt     No Known Problems Paternal Aunt      Patient has no known allergies  Current Outpatient Medications   Medication Sig Dispense Refill    Cholecalciferol (Vitamin D-3) 25 MCG (1000 UT) CAPS Take 1 capsule by mouth in the morning      bisacodyl (DULCOLAX) 5 mg EC tablet Take 2 tablets (10 mg total) by mouth once for 1 dose 2 tablet 0    polyethylene glycol (GOLYTELY) 4000 mL solution Take 4,000 mL by mouth once for 1 dose 4000 mL 0     No current facility-administered medications for this visit  Blood pressure 126/82, pulse 78, height 4' 11 5" (1 511 m), weight 60 4 kg (133 lb 3 2 oz), SpO2 98 %  PHYSICAL EXAM: Physical Exam  Constitutional:       Appearance: Normal appearance  She is well-developed  HENT:      Head: Normocephalic and atraumatic  Nose: Nose normal    Eyes:      Conjunctiva/sclera: Conjunctivae normal    Neck:      Thyroid: No thyromegaly  Vascular: No JVD  Trachea: No tracheal deviation  Cardiovascular:      Rate and Rhythm: Normal rate and regular rhythm  Heart sounds: Normal heart sounds  No murmur heard  No friction rub  No gallop  Pulmonary:      Effort: Pulmonary effort is normal  No respiratory distress  Breath sounds: Normal breath sounds  No wheezing or rales  Abdominal:      General: Bowel sounds are normal  There is no distension  Palpations: Abdomen is soft  There is no mass  Tenderness: There is no abdominal tenderness  There is no guarding  Hernia: No hernia is present  Musculoskeletal:         General: No tenderness or deformity  Cervical back: Neck supple  Right lower leg: No edema  Left lower leg: No edema  Lymphadenopathy:      Cervical: No cervical adenopathy  Skin:     General: Skin is warm and dry  Findings: No erythema or rash  Neurological:      Mental Status: She is alert and oriented to person, place, and time  Psychiatric:         Mood and Affect: Mood normal          Behavior: Behavior normal          Thought Content:  Thought content normal  Lab Results   Component Value Date    WBC 5 75 02/11/2022    HGB 13 9 02/11/2022    HCT 42 6 02/11/2022    MCV 94 02/11/2022     02/11/2022     Lab Results   Component Value Date    CALCIUM 9 9 02/11/2022    K 3 8 02/11/2022    CO2 27 02/11/2022     02/11/2022    BUN 12 02/11/2022    CREATININE 0 60 02/11/2022     Lab Results   Component Value Date    ALT 57 02/11/2022    AST 32 02/11/2022    ALKPHOS 82 02/11/2022     No results found for: INR, PROTIME    Mammo screening bilateral w 3d & cad    Result Date: 3/30/2022  Impression: No mammographic evidence of malignancy  ASSESSMENT/BI-RADS CATEGORY: Left: 2 - Benign Right: 2 - Benign Overall: 2 - Benign RECOMMENDATION:      - Routine screening mammogram in 1 year for both breasts  Workstation ID: [id number]       ASSESSMENT & PLAN:    Colon cancer screening  Screening for colon cancer - patient is at average risk for colon cancer screening  Rule out colorectal lesions including polyps or malignancy         -Schedule for colonoscopy  -High-fiber diet     -Patient was given instructions about the colonoscopy prep     -Patient was explained about  the risks and benefits of the procedure  Risks including but not limited to bleeding, infection, perforation were explained in detail  Also explained about less than 100% sensitivity with the exam and other alternatives  Gastroesophageal reflux disease without esophagitis  Gastroesophageal reflux disease - Patient has the symptoms of chronic acid reflux for a long time  Possible hiatal hernia or LES weakness  Should rule out Duque's esophagus because of chronic symptoms         -may take Pepcid as needed    -schedule for EGD    -Patient was explained about the lifestyle and dietary modifications  Advised to avoid fatty foods, chocolates, caffeine, alcohol and any other triggering foods  Avoid eating for at least 3 hours before going to bed

## 2022-07-14 NOTE — PROGRESS NOTES
Consultation - 126 [facility]  Jamar Protection 1970 female         Chief Complaint:  For colonoscopy    HPI:  58-year-old female with no significant past medical history was referred for screening colonoscopy  Reports having problems with acid reflux at times but not on any medication  Regular bowel movements and denies any blood or mucus in the stool  Good appetite, no recent weight loss  No abdominal pain, nausea or vomiting  Chaperon: Ms Kg Wang: Review of Systems   Constitutional: Negative for activity change, appetite change, chills, diaphoresis, fatigue, fever and unexpected weight change  HENT: Negative for ear discharge, ear pain, facial swelling, hearing loss, nosebleeds, sore throat, tinnitus and voice change  Eyes: Negative for pain, discharge, redness, itching and visual disturbance  Respiratory: Negative for apnea, cough, chest tightness, shortness of breath and wheezing  Cardiovascular: Negative for chest pain and palpitations  Gastrointestinal:        As noted in HPI   Endocrine: Negative for cold intolerance, heat intolerance and polyuria  Genitourinary: Negative for difficulty urinating, dysuria, flank pain, hematuria and urgency  Musculoskeletal: Negative for arthralgias, back pain, gait problem, joint swelling and myalgias  Skin: Negative for rash and wound  Neurological: Negative for dizziness, tremors, seizures, speech difficulty, light-headedness, numbness and headaches  Hematological: Negative for adenopathy  Does not bruise/bleed easily  Psychiatric/Behavioral: Negative for agitation, behavioral problems and confusion  The patient is not nervous/anxious           Past Medical History:   Diagnosis Date    Spontaneous      Last assessed 2017    Varicella       Past Surgical History:   Procedure Laterality Date    CHOLECYSTECTOMY      EXPLORATORY LAPAROTOMY       Social History     Socioeconomic History    Marital status: Legally      Spouse name: Not on file    Number of children: Not on file    Years of education: Not on file    Highest education level: Not on file   Occupational History    Not on file   Tobacco Use    Smoking status: Never Smoker    Smokeless tobacco: Never Used   Vaping Use    Vaping Use: Never used   Substance and Sexual Activity    Alcohol use: No     Comment:  Occ    Drug use: No     Comment: birthday only     Sexual activity: Not Currently     Partners: Male   Other Topics Concern    Not on file   Social History Narrative    Not on file     Social Determinants of Health     Financial Resource Strain: Not on file   Food Insecurity: Not on file   Transportation Needs: Not on file   Physical Activity: Not on file   Stress: Not on file   Social Connections: Not on file   Intimate Partner Violence: Not on file   Housing Stability: Not on file     Family History   Problem Relation Age of Onset    No Known Problems Mother     Diabetes Paternal Faye Ulices     Prostate cancer Paternal Grandfather     Cancer Paternal Grandfather         prostate    Diabetes Maternal Aunt     Breast cancer Maternal Aunt     No Known Problems Father     No Known Problems Sister     No Known Problems Brother     No Known Problems Son     Brain cancer Paternal Uncle     No Known Problems Brother     No Known Problems Brother     No Known Problems Brother     No Known Problems Brother     No Known Problems Brother     No Known Problems Brother     No Known Problems Brother     No Known Problems Brother     No Known Problems Sister     No Known Problems Son     No Known Problems Son     No Known Problems Maternal Grandmother     No Known Problems Maternal Grandfather     No Known Problems Paternal Grandmother     No Known Problems Maternal Aunt     No Known Problems Maternal Aunt     No Known Problems Paternal Aunt     No Known Problems Paternal Aunt      Patient has no known allergies  Current Outpatient Medications   Medication Sig Dispense Refill    Cholecalciferol (Vitamin D-3) 25 MCG (1000 UT) CAPS Take 1 capsule by mouth in the morning      bisacodyl (DULCOLAX) 5 mg EC tablet Take 2 tablets (10 mg total) by mouth once for 1 dose 2 tablet 0    polyethylene glycol (GOLYTELY) 4000 mL solution Take 4,000 mL by mouth once for 1 dose 4000 mL 0     No current facility-administered medications for this visit  Blood pressure 126/82, pulse 78, height 4' 11 5" (1 511 m), weight 60 4 kg (133 lb 3 2 oz), SpO2 98 %  PHYSICAL EXAM: Physical Exam  Constitutional:       Appearance: Normal appearance  She is well-developed  HENT:      Head: Normocephalic and atraumatic  Nose: Nose normal    Eyes:      Conjunctiva/sclera: Conjunctivae normal    Neck:      Thyroid: No thyromegaly  Vascular: No JVD  Trachea: No tracheal deviation  Cardiovascular:      Rate and Rhythm: Normal rate and regular rhythm  Heart sounds: Normal heart sounds  No murmur heard  No friction rub  No gallop  Pulmonary:      Effort: Pulmonary effort is normal  No respiratory distress  Breath sounds: Normal breath sounds  No wheezing or rales  Abdominal:      General: Bowel sounds are normal  There is no distension  Palpations: Abdomen is soft  There is no mass  Tenderness: There is no abdominal tenderness  There is no guarding  Hernia: No hernia is present  Musculoskeletal:         General: No tenderness or deformity  Cervical back: Neck supple  Right lower leg: No edema  Left lower leg: No edema  Lymphadenopathy:      Cervical: No cervical adenopathy  Skin:     General: Skin is warm and dry  Findings: No erythema or rash  Neurological:      Mental Status: She is alert and oriented to person, place, and time  Psychiatric:         Mood and Affect: Mood normal          Behavior: Behavior normal          Thought Content:  Thought content normal  Lab Results   Component Value Date    WBC 5 75 02/11/2022    HGB 13 9 02/11/2022    HCT 42 6 02/11/2022    MCV 94 02/11/2022     02/11/2022     Lab Results   Component Value Date    CALCIUM 9 9 02/11/2022    K 3 8 02/11/2022    CO2 27 02/11/2022     02/11/2022    BUN 12 02/11/2022    CREATININE 0 60 02/11/2022     Lab Results   Component Value Date    ALT 57 02/11/2022    AST 32 02/11/2022    ALKPHOS 82 02/11/2022     No results found for: INR, PROTIME    Mammo screening bilateral w 3d & cad    Result Date: 3/30/2022  Impression: No mammographic evidence of malignancy  ASSESSMENT/BI-RADS CATEGORY: Left: 2 - Benign Right: 2 - Benign Overall: 2 - Benign RECOMMENDATION:      - Routine screening mammogram in 1 year for both breasts  Workstation ID: [id number]       ASSESSMENT & PLAN:    Colon cancer screening  Screening for colon cancer - patient is at average risk for colon cancer screening  Rule out colorectal lesions including polyps or malignancy         -Schedule for colonoscopy  -High-fiber diet     -Patient was given instructions about the colonoscopy prep     -Patient was explained about  the risks and benefits of the procedure  Risks including but not limited to bleeding, infection, perforation were explained in detail  Also explained about less than 100% sensitivity with the exam and other alternatives  Gastroesophageal reflux disease without esophagitis  Gastroesophageal reflux disease - Patient has the symptoms of chronic acid reflux for a long time  Possible hiatal hernia or LES weakness  Should rule out Duque's esophagus because of chronic symptoms         -may take Pepcid as needed    -schedule for EGD    -Patient was explained about the lifestyle and dietary modifications  Advised to avoid fatty foods, chocolates, caffeine, alcohol and any other triggering foods  Avoid eating for at least 3 hours before going to bed

## 2022-07-14 NOTE — ASSESSMENT & PLAN NOTE
Gastroesophageal reflux disease - Patient has the symptoms of chronic acid reflux for a long time  Possible hiatal hernia or LES weakness  Should rule out Duque's esophagus because of chronic symptoms         -may take Pepcid as needed    -schedule for EGD    -Patient was explained about the lifestyle and dietary modifications  Advised to avoid fatty foods, chocolates, caffeine, alcohol and any other triggering foods  Avoid eating for at least 3 hours before going to bed

## 2022-07-15 NOTE — PATIENT INSTRUCTIONS
Scheduled date of EGD/colonoscopy (as of today):8/3/2022  Physician performing EGD/colonoscopy:DR BORJAS  Location of EGD/colonoscopy:[facility]  Desired bowel prep reviewed with patient:GOLYTELY/ADILSON PER DR Calhoun Page  Instructions reviewed with patient by:DANIEL LAKHANI  Clearances:

## 2022-08-02 RX ORDER — LIDOCAINE HYDROCHLORIDE 10 MG/ML
0.5 INJECTION, SOLUTION EPIDURAL; INFILTRATION; INTRACAUDAL; PERINEURAL ONCE AS NEEDED
Status: CANCELLED | OUTPATIENT
Start: 2022-08-02

## 2022-08-02 RX ORDER — SODIUM CHLORIDE, SODIUM LACTATE, POTASSIUM CHLORIDE, CALCIUM CHLORIDE 600; 310; 30; 20 MG/100ML; MG/100ML; MG/100ML; MG/100ML
125 INJECTION, SOLUTION INTRAVENOUS CONTINUOUS
Status: CANCELLED | OUTPATIENT
Start: 2022-08-02

## 2022-08-03 ENCOUNTER — HOSPITAL ENCOUNTER (OUTPATIENT)
Dept: GASTROENTEROLOGY | Facility: AMBULARY SURGERY CENTER | Age: 52
Setting detail: OUTPATIENT SURGERY
Discharge: HOME | End: 2022-08-03
Attending: INTERNAL MEDICINE | Admitting: INTERNAL MEDICINE
Payer: COMMERCIAL

## 2022-08-03 ENCOUNTER — ANESTHESIA (OUTPATIENT)
Dept: GASTROENTEROLOGY | Facility: AMBULARY SURGERY CENTER | Age: 52
End: 2022-08-03

## 2022-08-03 ENCOUNTER — ANESTHESIA EVENT (OUTPATIENT)
Dept: GASTROENTEROLOGY | Facility: AMBULARY SURGERY CENTER | Age: 52
End: 2022-08-03

## 2022-08-03 VITALS
BODY MASS INDEX: 24.55 KG/M2 | OXYGEN SATURATION: 97 % | DIASTOLIC BLOOD PRESSURE: 55 MMHG | RESPIRATION RATE: 16 BRPM | HEIGHT: 61 IN | SYSTOLIC BLOOD PRESSURE: 103 MMHG | WEIGHT: 130 LBS | TEMPERATURE: 98.4 F | HEART RATE: 61 BPM

## 2022-08-03 DIAGNOSIS — K21.9 GASTROESOPHAGEAL REFLUX DISEASE WITHOUT ESOPHAGITIS: ICD-10-CM

## 2022-08-03 DIAGNOSIS — Z12.11 COLON CANCER SCREENING: ICD-10-CM

## 2022-08-03 LAB
EXT PREGNANCY TEST URINE: NEGATIVE
EXT. CONTROL: NORMAL

## 2022-08-03 PROCEDURE — 81025 URINE PREGNANCY TEST: CPT | Performed by: INTERNAL MEDICINE

## 2022-08-03 PROCEDURE — 88305 TISSUE EXAM BY PATHOLOGIST: CPT | Performed by: PATHOLOGY

## 2022-08-03 PROCEDURE — 43239 EGD BIOPSY SINGLE/MULTIPLE: CPT | Performed by: INTERNAL MEDICINE

## 2022-08-03 PROCEDURE — 45380 COLONOSCOPY AND BIOPSY: CPT | Performed by: INTERNAL MEDICINE

## 2022-08-03 RX ORDER — LIDOCAINE HYDROCHLORIDE 20 MG/ML
INJECTION, SOLUTION EPIDURAL; INFILTRATION; INTRACAUDAL; PERINEURAL AS NEEDED
Status: DISCONTINUED | OUTPATIENT
Start: 2022-08-03 | End: 2022-08-03

## 2022-08-03 RX ORDER — PROPOFOL 10 MG/ML
INJECTION, EMULSION INTRAVENOUS AS NEEDED
Status: DISCONTINUED | OUTPATIENT
Start: 2022-08-03 | End: 2022-08-03

## 2022-08-03 RX ORDER — SODIUM CHLORIDE, SODIUM LACTATE, POTASSIUM CHLORIDE, CALCIUM CHLORIDE 600; 310; 30; 20 MG/100ML; MG/100ML; MG/100ML; MG/100ML
INJECTION, SOLUTION INTRAVENOUS CONTINUOUS PRN
Status: DISCONTINUED | OUTPATIENT
Start: 2022-08-03 | End: 2022-08-03

## 2022-08-03 RX ORDER — SIMETHICONE 20 MG/.3ML
EMULSION ORAL CODE/TRAUMA/SEDATION MEDICATION
Status: COMPLETED | OUTPATIENT
Start: 2022-08-03 | End: 2022-08-03

## 2022-08-03 RX ADMIN — LIDOCAINE HYDROCHLORIDE 80 MG: 20 INJECTION, SOLUTION EPIDURAL; INFILTRATION; INTRACAUDAL; PERINEURAL at 11:06

## 2022-08-03 RX ADMIN — PROPOFOL 30 MG: 10 INJECTION, EMULSION INTRAVENOUS at 11:08

## 2022-08-03 RX ADMIN — SODIUM CHLORIDE, SODIUM LACTATE, POTASSIUM CHLORIDE, AND CALCIUM CHLORIDE: .6; .31; .03; .02 INJECTION, SOLUTION INTRAVENOUS at 11:01

## 2022-08-03 RX ADMIN — Medication 40 MG: at 11:18

## 2022-08-03 RX ADMIN — PROPOFOL 30 MG: 10 INJECTION, EMULSION INTRAVENOUS at 11:10

## 2022-08-03 RX ADMIN — PROPOFOL 20 MG: 10 INJECTION, EMULSION INTRAVENOUS at 11:15

## 2022-08-03 RX ADMIN — PROPOFOL 100 MG: 10 INJECTION, EMULSION INTRAVENOUS at 11:06

## 2022-08-03 RX ADMIN — PROPOFOL 20 MG: 10 INJECTION, EMULSION INTRAVENOUS at 11:12

## 2022-08-03 NOTE — ANESTHESIA POSTPROCEDURE EVALUATION
Post-Op Assessment Note    CV Status:  Stable  Pain Score: 0    Pain management: adequate     Mental Status:  Sleepy and arousable   Hydration Status:  Stable   PONV Controlled:  None   Airway Patency:  Patent      Post Op Vitals Reviewed: Yes      Staff: CRNA         No complications documented      BP  97/55    Temp 97 7   Pulse 69   Resp 15   SpO2 97

## 2022-08-03 NOTE — INTERVAL H&P NOTE
H&P reviewed  After examining the patient I find no changes in the patients condition since the H&P had been written      Vitals:    08/03/22 1024   BP: 130/80   Pulse: 74   Resp: 16   Temp: (!) 97 °F (36 1 °C)   SpO2: 96%

## 2022-08-03 NOTE — ANESTHESIA PREPROCEDURE EVALUATION
Procedure:  COLONOSCOPY  EGD    Relevant Problems   ANESTHESIA (within normal limits)   (-) History of anesthesia complications      CARDIO   (+) Primary hypertriglyceridemia   (-) Chest pain   (-) MAC (dyspnea on exertion)      GI/HEPATIC   (+) Gastroesophageal reflux disease without esophagitis      PULMONARY   (-) Shortness of breath   (-) URI (upper respiratory infection)        Physical Exam    Airway    Mallampati score: I  TM Distance: >3 FB  Neck ROM: full     Dental   No notable dental hx     Cardiovascular      Pulmonary      Other Findings        Anesthesia Plan  ASA Score- 2     Anesthesia Type- IV sedation with anesthesia with ASA Monitors  Additional Monitors:   Airway Plan:           Plan Factors-Exercise tolerance (METS): >4 METS  Chart reviewed  Patient summary reviewed  Induction- intravenous  Postoperative Plan-     Informed Consent- Anesthetic plan and risks discussed with patient and son  I personally reviewed this patient with the CRNA  Discussed and agreed on the Anesthesia Plan with the CRNA  [place]

## 2022-08-05 ENCOUNTER — RA CDI HCC (OUTPATIENT)
Dept: OTHER | Facility: HOSPITAL | Age: 52
End: 2022-08-05

## 2022-08-05 NOTE — PROGRESS NOTES
[facility]  [facility]       Chart reviewed, no opportunity found: CHART REVIEWED, NO OPPORTUNITY FOUND        Patients Insurance        Commercial Insurance: [address] [place] [address]

## 2022-08-10 ENCOUNTER — OFFICE VISIT (OUTPATIENT)
Dept: FAMILY MEDICINE CLINIC | Facility: MEDICAL CENTER | Age: 52
End: 2022-08-10
Payer: COMMERCIAL

## 2022-08-10 ENCOUNTER — APPOINTMENT (OUTPATIENT)
Dept: LAB | Facility: MEDICAL CENTER | Age: 52
End: 2022-08-10
Payer: COMMERCIAL

## 2022-08-10 VITALS
OXYGEN SATURATION: 98 % | SYSTOLIC BLOOD PRESSURE: 130 MMHG | HEIGHT: 61 IN | HEART RATE: 77 BPM | DIASTOLIC BLOOD PRESSURE: 80 MMHG | WEIGHT: 138 LBS | BODY MASS INDEX: 26.06 KG/M2

## 2022-08-10 DIAGNOSIS — Z00.00 ANNUAL PHYSICAL EXAM: Primary | ICD-10-CM

## 2022-08-10 DIAGNOSIS — G43.009 MIGRAINE WITHOUT AURA AND WITHOUT STATUS MIGRAINOSUS, NOT INTRACTABLE: ICD-10-CM

## 2022-08-10 DIAGNOSIS — E78.5 ELEVATED LIPIDS: ICD-10-CM

## 2022-08-10 DIAGNOSIS — Z00.00 ANNUAL PHYSICAL EXAM: ICD-10-CM

## 2022-08-10 DIAGNOSIS — E55.9 VITAMIN D DEFICIENCY: ICD-10-CM

## 2022-08-10 LAB
25(OH)D3 SERPL-MCNC: 37 NG/ML (ref 30–100)
ALBUMIN SERPL BCP-MCNC: 3.9 G/DL (ref 3.5–5)
ALP SERPL-CCNC: 80 U/L (ref 46–116)
ALT SERPL W P-5'-P-CCNC: 37 U/L (ref 12–78)
ANION GAP SERPL CALCULATED.3IONS-SCNC: 3 MMOL/L (ref 4–13)
AST SERPL W P-5'-P-CCNC: 21 U/L (ref 5–45)
BILIRUB SERPL-MCNC: 1.15 MG/DL (ref 0.2–1)
BUN SERPL-MCNC: 13 MG/DL (ref 5–25)
CALCIUM SERPL-MCNC: 9 MG/DL (ref 8.3–10.1)
CHLORIDE SERPL-SCNC: 106 MMOL/L (ref 96–108)
CHOLEST SERPL-MCNC: 198 MG/DL
CO2 SERPL-SCNC: 27 MMOL/L (ref 21–32)
CREAT SERPL-MCNC: 0.65 MG/DL (ref 0.6–1.3)
GFR SERPL CREATININE-BSD FRML MDRD: 102 ML/MIN/1.73SQ M
GLUCOSE P FAST SERPL-MCNC: 94 MG/DL (ref 65–99)
HDLC SERPL-MCNC: 75 MG/DL
LDLC SERPL CALC-MCNC: 95 MG/DL (ref 0–100)
NONHDLC SERPL-MCNC: 123 MG/DL
POTASSIUM SERPL-SCNC: 4.1 MMOL/L (ref 3.5–5.3)
PROT SERPL-MCNC: 7.7 G/DL (ref 6.4–8.4)
SODIUM SERPL-SCNC: 136 MMOL/L (ref 135–147)
TRIGL SERPL-MCNC: 139 MG/DL

## 2022-08-10 PROCEDURE — 80061 LIPID PANEL: CPT

## 2022-08-10 PROCEDURE — 36415 COLL VENOUS BLD VENIPUNCTURE: CPT

## 2022-08-10 PROCEDURE — 80053 COMPREHEN METABOLIC PANEL: CPT

## 2022-08-10 PROCEDURE — 82306 VITAMIN D 25 HYDROXY: CPT

## 2022-08-10 PROCEDURE — 99396 PREV VISIT EST AGE 40-64: CPT

## 2022-08-10 PROCEDURE — 3725F SCREEN DEPRESSION PERFORMED: CPT

## 2022-08-10 RX ORDER — RIZATRIPTAN BENZOATE 10 MG/1
10 TABLET, ORALLY DISINTEGRATING ORAL AS NEEDED
Qty: 18 TABLET | Refills: 0 | Status: SHIPPED | OUTPATIENT
Start: 2022-08-10

## 2022-08-10 NOTE — PROGRESS NOTES
ADULT ANNUAL [address]  [facility]    NAME: Ellie Padilla  AGE: 45 y o  SEX: female  : 1970     DATE: 8/10/2022     Assessment and Plan:   Colonoscopy up-to-date, repeat in 5 years due to polyps  Mammogram up-to-date  Follow-up in 6 months or sooner if needed  Fasting blood work ordered, to be done earliest convenience  Advised patient to follow-up with OBGYN for annual exam/PAP  Advised patient to schedule appointment with ophthalmologist for routine eye exam   Start Maxalt, be taken as needed for migraine headaches  Problem List Items Addressed This Visit    None     Visit Diagnoses     Annual physical exam    -  Primary    Relevant Orders    Lipid panel (Completed)    Comprehensive metabolic panel (Completed)    Vitamin D deficiency        Relevant Orders    Vitamin D 25 hydroxy (Completed)    Elevated lipids        Relevant Orders    Lipid panel (Completed)    Migraine without aura and without status migrainosus, not intractable        Relevant Medications    rizatriptan (Maxalt-MLT) 10 mg disintegrating tablet          Immunizations and preventive care screenings were discussed with patient today  Appropriate education was printed on patient's after visit summary  Counseling:  Alcohol/drug use: discussed moderation in alcohol intake, the recommendations for healthy alcohol use, and avoidance of illicit drug use  Dental Health: discussed importance of regular tooth brushing, flossing, and dental visits  Exercise: the importance of regular exercise/physical activity was discussed  Recommend exercise 3-5 times per week for at least 30 minutes  Denies smoking, no smoking history, etoh use socially, denies drug use  Return in 6 months (on 2/10/2023) for 6 month follow up       Chief Complaint:     Chief Complaint   Patient presents with    Annual Exam      History of Present Illness:     Adult Annual Physical   Patient here for a comprehensive physical exam  The patient reports problems - Reports left sided headache x 5 months, occurs approx  3-4 days per week with assoc  left eye twitching, double and blurred vision at times, nausea, slight photophobia  She also reports some twitching in left hand  Denies speech changes, dizziness  Takes ibuprofen which gives some relief but headache returns  No family history of migraines  She does state she was getting headaches previously, prior to menopause, however over the past 5 months she has had them more frequently  She is also currently perimenopausal  LMP 4 months ago  She does add left eye twitching has been ongoing for approx  4 years  Also reports increased stress and anxiety  JACOB screen-10  She does wear glasses and is over due for eye exam as it has been >2 years since last eye exam    Diet and Physical Activity  Diet/Nutrition: well balanced diet and consuming 3-5 servings of fruits/vegetables daily  Exercise: no formal exercise  Depression Screening  PHQ-2/9 Depression Screening    Little interest or pleasure in doing things: 1 - several days  Feeling down, depressed, or hopeless: 0 - not at all  PHQ-2 Score: 1  PHQ-2 Interpretation: Negative depression screen       JACOB-7 Flowsheet Screening    Flowsheet Row Most Recent Value   Over the last 2 weeks, how often have you been bothered by any of the following problems? Feeling nervous, anxious, or on edge 1   Not being able to stop or control worrying 1   Worrying too much about different things 2   Trouble relaxing 1   Being so restless that it is hard to sit still 2   Becoming easily annoyed or irritable 2   Feeling afraid as if something awful might happen 1   JACOB-7 Total Score 10        General Health  Sleep: gets 4-6 hours of sleep on average  Hearing: normal - bilateral   Vision: wears glasses and Has not had eye exam in over 2 years      Dental: regular dental visits         /GYN Health  Patient is: perimenopausal  Last menstrual period: 4 months ago  Contraceptive method: none  Review of Systems:     Review of Systems   Constitutional: Negative  HENT: Negative  Eyes: Negative  Respiratory: Negative  Cardiovascular: Negative  Gastrointestinal: Negative  Endocrine: Negative  Genitourinary: Negative  Musculoskeletal: Negative  Skin: Negative  Allergic/Immunologic: Negative  Neurological: Positive for headaches  Negative for dizziness, tremors, seizures, syncope, facial asymmetry, speech difficulty, weakness and numbness  Hematological: Negative  Psychiatric/Behavioral: The patient is nervous/anxious  Past Medical History:     Past Medical History:   Diagnosis Date    Spontaneous      Last assessed 2017    Varicella       Past Surgical History:     Past Surgical History:   Procedure Laterality Date    CHOLECYSTECTOMY      EXPLORATORY LAPAROTOMY        Social History:     Social History     Socioeconomic History    Marital status: Legally      Spouse name: None    Number of children: None    Years of education: None    Highest education level: None   Occupational History    None   Tobacco Use    Smoking status: Never Smoker    Smokeless tobacco: Never Used   Vaping Use    Vaping Use: Never used   Substance and Sexual Activity    Alcohol use: Yes     Comment:  Occ    Drug use: No     Comment: birthday only     Sexual activity: Not Currently     Partners: Male   Other Topics Concern    None   Social History Narrative    None     Social Determinants of Health     Financial Resource Strain: Not on file   Food Insecurity: Not on file   Transportation Needs: Not on file   Physical Activity: Not on file   Stress: Not on file   Social Connections: Not on file   Intimate Partner Violence: Not on file   Housing Stability: Not on file      Family History:     Family History   Problem Relation Age of Onset    No Known Problems Mother    Skylar Daugherty Diabetes Paternal Grandfather     Prostate cancer Paternal Grandfather     Cancer Paternal Grandfather         prostate    Diabetes Maternal Aunt     Breast cancer Maternal Aunt     No Known Problems Father     No Known Problems Sister     No Known Problems Brother     No Known Problems Son     Brain cancer Paternal Uncle     No Known Problems Brother     No Known Problems Brother     No Known Problems Brother     No Known Problems Brother     No Known Problems Brother     No Known Problems Brother     No Known Problems Brother     No Known Problems Brother     No Known Problems Sister     No Known Problems Son     No Known Problems Son     No Known Problems Maternal Grandmother     No Known Problems Maternal Grandfather     No Known Problems Paternal Grandmother     No Known Problems Maternal Aunt     No Known Problems Maternal Aunt     No Known Problems Paternal Aunt     No Known Problems Paternal Aunt       Current Medications:     Current Outpatient Medications   Medication Sig Dispense Refill    Calcium Carbonate (CALCIUM 500 PO) Take 1 tablet by mouth in the morning      Cholecalciferol (Vitamin D-3) 25 MCG (1000 UT) CAPS Take 2 capsules by mouth in the morning      rizatriptan (Maxalt-MLT) 10 mg disintegrating tablet Take 1 tablet (10 mg total) by mouth as needed for migraine Take at the onset of migraine; if symptoms continue or return, may take another dose at least 2 hours after first dose  Take no more than 2 doses in a day  18 tablet 0     No current facility-administered medications for this visit  Allergies:     No Known Allergies   Physical Exam:     /80 (BP Location: Left arm, Patient Position: Sitting, Cuff Size: Adult)   Pulse 77   Ht 5' 1" (1 549 m)   Wt 62 6 kg (138 lb)   SpO2 98%   BMI 26 07 kg/m²     Physical Exam  Vitals and nursing note reviewed  Constitutional:       General: She is not in acute distress  Appearance: Normal appearance   She is normal weight  She is not ill-appearing  HENT:      Head: Normocephalic and atraumatic  Right Ear: Tympanic membrane, ear canal and external ear normal       Left Ear: Tympanic membrane, ear canal and external ear normal       Nose: Nose normal       Mouth/Throat:      Mouth: Mucous membranes are moist       Pharynx: Oropharynx is clear  Eyes:      General:         Right eye: No discharge  Left eye: No discharge  Conjunctiva/sclera: Conjunctivae normal       Pupils: Pupils are equal, round, and reactive to light  Comments: Left eye twitching     Cardiovascular:      Rate and Rhythm: Normal rate and regular rhythm  Pulses: Normal pulses  Heart sounds: Normal heart sounds  Pulmonary:      Effort: Pulmonary effort is normal       Breath sounds: Normal breath sounds  Abdominal:      General: Abdomen is flat  Bowel sounds are normal       Palpations: Abdomen is soft  Tenderness: There is no abdominal tenderness  Musculoskeletal:         General: Normal range of motion  Cervical back: Normal range of motion and neck supple  Lymphadenopathy:      Cervical: No cervical adenopathy  Skin:     General: Skin is warm and dry  Neurological:      General: No focal deficit present  Mental Status: She is alert and oriented to person, place, and time  Mental status is at baseline  Motor: No weakness  Gait: Gait normal    Psychiatric:         Mood and Affect: Mood normal          Behavior: Behavior normal          Thought Content:  Thought content normal             LAURITA Stevenson  [facility]

## 2022-08-10 NOTE — PATIENT INSTRUCTIONS

## 2022-09-22 ENCOUNTER — HOSPITAL ENCOUNTER (OUTPATIENT)
Dept: NEUROLOGY | Facility: CLINIC | Age: 52
End: 2022-09-22
Payer: COMMERCIAL

## 2022-09-22 DIAGNOSIS — R20.2 NUMBNESS AND TINGLING IN BOTH HANDS: ICD-10-CM

## 2022-09-22 DIAGNOSIS — R20.0 NUMBNESS AND TINGLING IN BOTH HANDS: ICD-10-CM

## 2022-09-22 PROCEDURE — 95886 MUSC TEST DONE W/N TEST COMP: CPT | Performed by: PSYCHIATRY & NEUROLOGY

## 2022-09-22 PROCEDURE — 95911 NRV CNDJ TEST 9-10 STUDIES: CPT | Performed by: PSYCHIATRY & NEUROLOGY

## 2022-10-11 PROBLEM — Z12.11 COLON CANCER SCREENING: Status: RESOLVED | Noted: 2022-07-14 | Resolved: 2022-10-11

## 2023-03-09 ENCOUNTER — RA CDI HCC (OUTPATIENT)
Dept: OTHER | Facility: HOSPITAL | Age: 53
End: 2023-03-09

## 2023-03-09 NOTE — PROGRESS NOTES
[facility]  coding opportunities       Chart reviewed, no opportunity found: CHART REVIEWED, NO OPPORTUNITY FOUND        Patients Insurance        Commercial Insurance: [address] [place] [address]

## 2023-03-15 NOTE — PROGRESS NOTES
Assessment/Plan:    Colonoscopy up-to-date, next due 8/2027  Mammogram due, order placed  OB/GYN visit/Pap due  Encouraged to schedule with OBGYN provider  Follow-up in 1 year for annual physical, sooner if needed  1  Gastroesophageal reflux disease without esophagitis  Symptoms appear to be stable  Advised to avoid food triggers  Take Pepcid as needed per GI     2  Screening mammogram for breast cancer  Last mammogram 3/29/2022  Order placed  - Mammo screening bilateral w 3d & cad; Future    3  Chronic bilateral thoracic back pain  Advised to take meloxicam once daily for pain and inflammation relief  Methocarbamol as needed for pain, spasms  Avoid driving or working with this medication  Also advised to try ice or heating pad and pain patch such as lidocaine, icy hot, salonpas  Try back and arm stretches, exercises  - meloxicam (MOBIC) 7 5 mg tablet; Take 1 tablet (7 5 mg total) by mouth daily as needed for moderate pain  Dispense: 30 tablet; Refill: 0  - methocarbamol (ROBAXIN) 500 mg tablet; Take 1 tablet (500 mg total) by mouth 3 (three) times a day  Dispense: 30 tablet; Refill: 0    4  Numbness and tingling in both hands  Diagnosed with mild carpal tunnel on right per EMG 9/22  Good results with use of wrist brace  Symptoms improving  5  Vitamin D deficiency  Vitamin D level stable on most recent labs  Continue Vitamin D otc  Subjective:      Patient ID: Lashonda Worrell is a 47 y o  female  43-year-old pleasant female presents for 6-month follow-up  She is here today with her son who assists with translating for her  She is doing well overall  She has GERD without esophagitis  Was advised by gastroenterology she may take Pepcid as needed  She tells me acidic spicy foods trigger her symptoms  She is not currently taking pepcid   She was complaining of a left-sided headache at her last office visit during her physical that had been ongoing for approximately 5 months at that point   She was prescribed Maxalt to be taken as needed and follow-up with eye doctor for routine eye exam  She reports the Maxalt did help her symptoms and headaches have improved and are not bothersome at this time  She has vitamin D deficiency  Currently on vitamin D over-the-counter  She was having numbness/tingling in hands with pain, EMG performed 9/2022 which showed mild carpal tunnel on right  She has been wearing wrist brace with good response  Reports pain is much improved  She is complaining of upper/mid back pain on left and right side x 3 months, worse with movement  Denies fall, injury, urinary sx, parasthesias, weakness  She takes Ibuprofen which does give her relief  She does lift heavy items for work at times  Mammogram is due this month, patient agreeable  She has not yet seen her OBGYN provider for exam/pap  The following portions of the patient's history were reviewed and updated as appropriate: allergies, current medications, past family history, past medical history, past surgical history and problem list     Review of Systems   Constitutional: Negative  HENT: Negative  Eyes: Negative  Respiratory: Negative  Cardiovascular: Negative  Gastrointestinal: Negative  Endocrine: Negative  Genitourinary: Negative  Musculoskeletal: Positive for back pain (upper/mid)  Skin: Negative  Allergic/Immunologic: Negative  Neurological: Negative  Hematological: Negative  Psychiatric/Behavioral: Negative  Objective:      /84   Pulse 74   Ht 5' 1" (1 549 m)   Wt 62 6 kg (138 lb)   SpO2 98%   BMI 26 07 kg/m²          Physical Exam  Vitals and nursing note reviewed  Constitutional:       General: She is not in acute distress  Appearance: Normal appearance  She is normal weight  She is not ill-appearing  HENT:      Head: Normocephalic and atraumatic     Eyes:      Conjunctiva/sclera: Conjunctivae normal       Pupils: Pupils are equal, round, and reactive to light  Cardiovascular:      Rate and Rhythm: Normal rate and regular rhythm  Pulses: Normal pulses  Heart sounds: Normal heart sounds  No murmur heard  Pulmonary:      Effort: Pulmonary effort is normal  No respiratory distress  Breath sounds: Normal breath sounds  No stridor  No wheezing, rhonchi or rales  Abdominal:      General: Abdomen is flat  Bowel sounds are normal       Palpations: Abdomen is soft  Tenderness: There is no abdominal tenderness  Musculoskeletal:         General: Normal range of motion  Cervical back: Normal range of motion and neck supple  Tenderness (paraspinal, left and right ) present  No swelling, deformity or signs of trauma  Pain with movement present  Normal range of motion  Thoracic back: Tenderness (paraspinal, left and right) present  No swelling, deformity or signs of trauma  Lumbar back: Normal       Right lower leg: No edema  Left lower leg: No edema  Skin:     General: Skin is warm and dry  Neurological:      General: No focal deficit present  Mental Status: She is alert and oriented to person, place, and time  Mental status is at baseline  Psychiatric:         Mood and Affect: Mood normal          Behavior: Behavior normal          Thought Content: Thought content normal            BMI Counseling: Body mass index is 26 07 kg/m²  The BMI is above normal  Nutrition recommendations include encouraging healthy choices of fruits and vegetables, moderation in carbohydrate intake and increasing intake of lean protein  Exercise recommendations include moderate physical activity 150 minutes/week and exercising 3-5 times per week  No pharmacotherapy was ordered  Rationale for BMI follow-up plan is due to patient being overweight or obese  Depression Screening and Follow-up Plan: Patient was screened for depression during today's encounter   They screened negative with a PHQ-2 score of 0              LAURITA Seo

## 2023-03-16 ENCOUNTER — OFFICE VISIT (OUTPATIENT)
Dept: FAMILY MEDICINE CLINIC | Facility: MEDICAL CENTER | Age: 53
End: 2023-03-16

## 2023-03-16 VITALS
DIASTOLIC BLOOD PRESSURE: 84 MMHG | HEIGHT: 61 IN | OXYGEN SATURATION: 98 % | SYSTOLIC BLOOD PRESSURE: 130 MMHG | WEIGHT: 138 LBS | BODY MASS INDEX: 26.06 KG/M2 | HEART RATE: 74 BPM

## 2023-03-16 DIAGNOSIS — E55.9 VITAMIN D DEFICIENCY: ICD-10-CM

## 2023-03-16 DIAGNOSIS — G89.29 CHRONIC BILATERAL THORACIC BACK PAIN: ICD-10-CM

## 2023-03-16 DIAGNOSIS — K21.9 GASTROESOPHAGEAL REFLUX DISEASE WITHOUT ESOPHAGITIS: Primary | ICD-10-CM

## 2023-03-16 DIAGNOSIS — R20.2 NUMBNESS AND TINGLING IN BOTH HANDS: ICD-10-CM

## 2023-03-16 DIAGNOSIS — M54.6 CHRONIC BILATERAL THORACIC BACK PAIN: ICD-10-CM

## 2023-03-16 DIAGNOSIS — Z12.31 SCREENING MAMMOGRAM FOR BREAST CANCER: ICD-10-CM

## 2023-03-16 DIAGNOSIS — R20.0 NUMBNESS AND TINGLING IN BOTH HANDS: ICD-10-CM

## 2023-03-16 RX ORDER — MELOXICAM 7.5 MG/1
7.5 TABLET ORAL DAILY PRN
Qty: 30 TABLET | Refills: 0 | Status: SHIPPED | OUTPATIENT
Start: 2023-03-16

## 2023-03-16 RX ORDER — METHOCARBAMOL 500 MG/1
500 TABLET, FILM COATED ORAL 3 TIMES DAILY
Qty: 30 TABLET | Refills: 0 | Status: SHIPPED | OUTPATIENT
Start: 2023-03-16

## 2023-03-16 NOTE — PATIENT INSTRUCTIONS
Meloxicam 7 5 mg once daily for pain and inflammation, take with food  Methocarbamol as needed for back muscle spasms  Caution with driving, working  Mammogram due  Please call and schedule  Take Pepcid as needed for gerd symptoms, avoid food triggers  Schedule visit with your OBGYN doctor for annual exam and pap smear

## 2023-05-16 ENCOUNTER — HOSPITAL ENCOUNTER (OUTPATIENT)
Dept: RADIOLOGY | Age: 53
Discharge: HOME | End: 2023-05-16

## 2023-05-16 VITALS — WEIGHT: 132 LBS | BODY MASS INDEX: 24.92 KG/M2 | HEIGHT: 61 IN

## 2023-05-16 DIAGNOSIS — Z12.31 SCREENING MAMMOGRAM FOR BREAST CANCER: ICD-10-CM

## 2023-08-14 DIAGNOSIS — L23.7 POISON IVY DERMATITIS: Primary | ICD-10-CM

## 2023-08-14 RX ORDER — CLOBETASOL PROPIONATE 0.5 MG/G
OINTMENT TOPICAL 2 TIMES DAILY
Qty: 30 G | Refills: 0 | Status: SHIPPED | OUTPATIENT
Start: 2023-08-14

## 2023-10-23 ENCOUNTER — DOCUMENTATION (OUTPATIENT)
Dept: FAMILY MEDICINE CLINIC | Facility: MEDICAL CENTER | Age: 53
End: 2023-10-23

## 2023-10-23 DIAGNOSIS — B36.0 TINEA VERSICOLOR: Primary | ICD-10-CM

## 2023-10-23 RX ORDER — KETOCONAZOLE 20 MG/G
CREAM TOPICAL DAILY
Qty: 30 G | Refills: 0 | Status: SHIPPED | OUTPATIENT
Start: 2023-10-23 | End: 2023-11-06

## 2023-10-24 DIAGNOSIS — Z13.220 LIPID SCREENING: ICD-10-CM

## 2023-10-24 DIAGNOSIS — Z13.1 DIABETES MELLITUS SCREENING: ICD-10-CM

## 2023-10-24 DIAGNOSIS — Z13.29 THYROID DISORDER SCREEN: ICD-10-CM

## 2023-10-24 DIAGNOSIS — Z13.0 SCREENING FOR DISORDER OF BLOOD AND BLOOD-FORMING ORGANS: ICD-10-CM

## 2023-10-24 DIAGNOSIS — E55.9 VITAMIN D DEFICIENCY: Primary | ICD-10-CM

## 2023-10-31 ENCOUNTER — APPOINTMENT (OUTPATIENT)
Dept: LAB | Facility: CLINIC | Age: 53
End: 2023-10-31
Payer: COMMERCIAL

## 2023-10-31 DIAGNOSIS — Z13.0 SCREENING FOR DISORDER OF BLOOD AND BLOOD-FORMING ORGANS: ICD-10-CM

## 2023-10-31 DIAGNOSIS — Z13.1 DIABETES MELLITUS SCREENING: ICD-10-CM

## 2023-10-31 DIAGNOSIS — Z13.29 THYROID DISORDER SCREEN: ICD-10-CM

## 2023-10-31 DIAGNOSIS — Z13.220 LIPID SCREENING: ICD-10-CM

## 2023-10-31 DIAGNOSIS — E55.9 VITAMIN D DEFICIENCY: ICD-10-CM

## 2023-10-31 LAB
25(OH)D3 SERPL-MCNC: 31.9 NG/ML (ref 30–100)
ALBUMIN SERPL BCP-MCNC: 4.1 G/DL (ref 3.5–5)
ALP SERPL-CCNC: 88 U/L (ref 34–104)
ALT SERPL W P-5'-P-CCNC: 12 U/L (ref 7–52)
ANION GAP SERPL CALCULATED.3IONS-SCNC: 4 MMOL/L
AST SERPL W P-5'-P-CCNC: 11 U/L (ref 13–39)
BILIRUB SERPL-MCNC: 0.52 MG/DL (ref 0.2–1)
BUN SERPL-MCNC: 13 MG/DL (ref 5–25)
CALCIUM SERPL-MCNC: 8.6 MG/DL (ref 8.4–10.2)
CHLORIDE SERPL-SCNC: 105 MMOL/L (ref 96–108)
CHOLEST SERPL-MCNC: 196 MG/DL
CO2 SERPL-SCNC: 28 MMOL/L (ref 21–32)
CREAT SERPL-MCNC: 0.43 MG/DL (ref 0.6–1.3)
EST. AVERAGE GLUCOSE BLD GHB EST-MCNC: 108 MG/DL
GFR SERPL CREATININE-BSD FRML MDRD: 115 ML/MIN/1.73SQ M
GLUCOSE P FAST SERPL-MCNC: 90 MG/DL (ref 65–99)
HBA1C MFR BLD: 5.4 %
HDLC SERPL-MCNC: 70 MG/DL
LDLC SERPL CALC-MCNC: 88 MG/DL (ref 0–100)
NONHDLC SERPL-MCNC: 126 MG/DL
POTASSIUM SERPL-SCNC: 4 MMOL/L (ref 3.5–5.3)
PROT SERPL-MCNC: 6.7 G/DL (ref 6.4–8.4)
SODIUM SERPL-SCNC: 137 MMOL/L (ref 135–147)
TRIGL SERPL-MCNC: 190 MG/DL
TSH SERPL DL<=0.05 MIU/L-ACNC: 2.24 UIU/ML (ref 0.45–4.5)

## 2023-10-31 PROCEDURE — 36415 COLL VENOUS BLD VENIPUNCTURE: CPT

## 2023-10-31 PROCEDURE — 84443 ASSAY THYROID STIM HORMONE: CPT

## 2023-10-31 PROCEDURE — 80061 LIPID PANEL: CPT

## 2023-10-31 PROCEDURE — 83036 HEMOGLOBIN GLYCOSYLATED A1C: CPT

## 2023-10-31 PROCEDURE — 80053 COMPREHEN METABOLIC PANEL: CPT

## 2023-10-31 PROCEDURE — 82306 VITAMIN D 25 HYDROXY: CPT

## 2023-11-02 ENCOUNTER — OFFICE VISIT (OUTPATIENT)
Dept: FAMILY MEDICINE CLINIC | Facility: MEDICAL CENTER | Age: 53
End: 2023-11-02
Payer: COMMERCIAL

## 2023-11-02 VITALS
WEIGHT: 138.6 LBS | RESPIRATION RATE: 16 BRPM | TEMPERATURE: 97.8 F | SYSTOLIC BLOOD PRESSURE: 116 MMHG | BODY MASS INDEX: 26.17 KG/M2 | HEART RATE: 86 BPM | DIASTOLIC BLOOD PRESSURE: 68 MMHG | HEIGHT: 61 IN | OXYGEN SATURATION: 98 %

## 2023-11-02 DIAGNOSIS — Z12.31 ENCOUNTER FOR SCREENING MAMMOGRAM FOR BREAST CANCER: ICD-10-CM

## 2023-11-02 DIAGNOSIS — Z00.00 ANNUAL PHYSICAL EXAM: Primary | ICD-10-CM

## 2023-11-02 DIAGNOSIS — R07.89 CHEST WALL PAIN: ICD-10-CM

## 2023-11-02 DIAGNOSIS — R07.89 INTERMITTENT LEFT-SIDED CHEST PAIN: ICD-10-CM

## 2023-11-02 DIAGNOSIS — Z12.4 SCREENING FOR CERVICAL CANCER: ICD-10-CM

## 2023-11-02 DIAGNOSIS — E78.1 PRIMARY HYPERTRIGLYCERIDEMIA: ICD-10-CM

## 2023-11-02 DIAGNOSIS — E55.9 VITAMIN D DEFICIENCY: ICD-10-CM

## 2023-11-02 PROBLEM — Z87.19 HISTORY OF DIVERTICULITIS: Status: RESOLVED | Noted: 2018-11-13 | Resolved: 2023-11-02

## 2023-11-02 PROBLEM — R92.8 ABNORMAL MAMMOGRAM OF BOTH BREASTS: Status: RESOLVED | Noted: 2017-12-21 | Resolved: 2023-11-02

## 2023-11-02 PROCEDURE — 99214 OFFICE O/P EST MOD 30 MIN: CPT

## 2023-11-02 PROCEDURE — 99396 PREV VISIT EST AGE 40-64: CPT

## 2023-11-02 PROCEDURE — 93000 ELECTROCARDIOGRAM COMPLETE: CPT

## 2023-11-02 RX ORDER — MELOXICAM 15 MG/1
15 TABLET ORAL DAILY
Qty: 30 TABLET | Refills: 0 | Status: SHIPPED | OUTPATIENT
Start: 2023-11-02

## 2023-11-02 NOTE — PATIENT INSTRUCTIONS
Take meloxicam as prescribed once daily for muscular pain. Do not take any additional NSAIDs with this medication such as Aleve, naproxen, Advil, ibuprofen. If chest wall pain persists or does not improve, call the office. Please call and schedule an appointment with GYN for exam and Pap. Mammogram order placed, due in May. Call and schedule this. Wellness Visit for Adults   AMBULATORY CARE:   A wellness visit  is when you see your healthcare provider to get screened for health problems. Your healthcare provider will also give you advice on how to stay healthy. Write down your questions so you remember to ask them. Ask your healthcare provider how often you should have a wellness visit. What happens at a wellness visit:  Your healthcare provider will ask about your health, and your family history of health problems. This includes high blood pressure, heart disease, and cancer. He or she will ask if you have symptoms that concern you, if you smoke, and about your mood. You may also be asked about your intake of medicines, supplements, food, and alcohol. Any of the following may be done: Your weight  will be checked. Your height may also be checked so your body mass index (BMI) can be calculated. Your BMI shows if you are at a healthy weight. Your blood pressure  and heart rate will be checked. Your temperature may also be checked. Blood and urine tests  may be done. Blood tests may be done to check your cholesterol levels. Abnormal cholesterol levels increase your risk for heart disease and stroke. You may also need a blood or urine test to check for diabetes if you are at increased risk. Urine tests may be done to look for signs of an infection or kidney disease. A physical exam  includes checking your heartbeat and lungs with a stethoscope. Your healthcare provider may also check your skin to look for sun damage. Screening tests  may be recommended.  A screening test is done to check for diseases that may not cause symptoms. The screening tests you may need depend on your age, gender, family history, and lifestyle habits. For example, colorectal screening may be recommended if you are 48years old or older. Screening tests you need if you are a woman:   A Pap smear  is used to screen for cervical cancer. Pap smears are usually done every 3 to 5 years depending on your age. You may need them more often if you have had abnormal Pap smear test results in the past. Ask your healthcare provider how often you should have a Pap smear. A mammogram  is an x-ray of your breasts to screen for breast cancer. Experts recommend mammograms every 2 years starting at age 48 years. You may need a mammogram at age 52 years or younger if you have an increased risk for breast cancer. Talk to your healthcare provider about when you should start having mammograms and how often you need them. Vaccines you may need:   Get an influenza vaccine  every year. The influenza vaccine protects you from the flu. Several types of viruses cause the flu. The viruses change over time, so new vaccines are made each year. Get a tetanus-diphtheria (Td) booster vaccine  every 10 years. This vaccine protects you against tetanus and diphtheria. Tetanus is a severe infection that may cause painful muscle spasms and lockjaw. Diphtheria is a severe bacterial infection that causes a thick covering in the back of your mouth and throat. Get a human papillomavirus (HPV) vaccine  if you are female and aged 23 to 32 or male 23 to 24 and never received it. This vaccine protects you from HPV infection. HPV is the most common infection spread by sexual contact. HPV may also cause vaginal, penile, and anal cancers. Get a pneumococcal vaccine  if you are aged 72 years or older. The pneumococcal vaccine is an injection given to protect you from pneumococcal disease. Pneumococcal disease is an infection caused by pneumococcal bacteria.  The infection may cause pneumonia, meningitis, or an ear infection. Get a shingles vaccine  if you are 60 or older, even if you have had shingles before. The shingles vaccine is an injection to protect you from the varicella-zoster virus. This is the same virus that causes chickenpox. Shingles is a painful rash that develops in people who had chickenpox or have been exposed to the virus. How to eat healthy:  My Plate is a model for planning healthy meals. It shows the types and amounts of foods that should go on your plate. Fruits and vegetables make up about half of your plate, and grains and protein make up the other half. A serving of dairy is included on the side of your plate. The amount of calories and serving sizes you need depends on your age, gender, weight, and height. Examples of healthy foods are listed below:  Eat a variety of vegetables  such as dark green, red, and orange vegetables. You can also include canned vegetables low in sodium (salt) and frozen vegetables without added butter or sauces. Eat a variety of fresh fruits , canned fruit in 100% juice, frozen fruit, and dried fruit. Include whole grains. At least half of the grains you eat should be whole grains. Examples include whole-wheat bread, wheat pasta, brown rice, and whole-grain cereals such as oatmeal.    Eat a variety of protein foods such as seafood (fish and shellfish), lean meat, and poultry without skin (turkey and chicken). Examples of lean meats include pork leg, shoulder, or tenderloin, and beef round, sirloin, tenderloin, and extra lean ground beef. Other protein foods include eggs and egg substitutes, beans, peas, soy products, nuts, and seeds. Choose low-fat dairy products such as skim or 1% milk or low-fat yogurt, cheese, and cottage cheese. Limit unhealthy fats  such as butter, hard margarine, and shortening. Exercise:  Exercise at least 30 minutes per day on most days of the week.  Some examples of exercise include walking, biking, dancing, and swimming. You can also fit in more physical activity by taking the stairs instead of the elevator or parking farther away from stores. Include muscle strengthening activities 2 days each week. Regular exercise provides many health benefits. It helps you manage your weight, and decreases your risk for type 2 diabetes, heart disease, stroke, and high blood pressure. Exercise can also help improve your mood. Ask your healthcare provider about the best exercise plan for you. General health and safety guidelines:   Do not smoke. Nicotine and other chemicals in cigarettes and cigars can cause lung damage. Ask your healthcare provider for information if you currently smoke and need help to quit. E-cigarettes or smokeless tobacco still contain nicotine. Talk to your healthcare provider before you use these products. Limit alcohol. A drink of alcohol is 12 ounces of beer, 5 ounces of wine, or 1½ ounces of liquor. Lose weight, if needed. Being overweight increases your risk of certain health conditions. These include heart disease, high blood pressure, type 2 diabetes, and certain types of cancer. Protect your skin. Do not sunbathe or use tanning beds. Use sunscreen with a SPF 15 or higher. Apply sunscreen at least 15 minutes before you go outside. Reapply sunscreen every 2 hours. Wear protective clothing, hats, and sunglasses when you are outside. Drive safely. Always wear your seatbelt. Make sure everyone in your car wears a seatbelt. A seatbelt can save your life if you are in an accident. Do not use your cell phone when you are driving. This could distract you and cause an accident. Pull over if you need to make a call or send a text message. Practice safe sex. Use latex condoms if are sexually active and have more than one partner. Your healthcare provider may recommend screening tests for sexually transmitted infections (STIs).     Wear helmets, lifejackets, and protective gear. Always wear a helmet when you ride a bike or motorcycle, go skiing, or play sports that could cause a head injury. Wear protective equipment when you play sports. Wear a lifejacket when you are on a boat or doing water sports. © Copyright [employer] 2023 Information is for End User's use only and may not be sold, redistributed or otherwise used for commercial purposes. The above information is an  only. It is not intended as medical advice for individual conditions or treatments. Talk to your doctor, nurse or pharmacist before following any medical regimen to see if it is safe and effective for you.

## 2023-11-02 NOTE — PROGRESS NOTES
ADULT ANNUAL [facility]    NAME: Gardenia Ellington  AGE: 46 y.o. SEX: female  : 1970     DATE: 2023     Assessment and Plan:   Influenza vaccine declined. Overdue for GYN exam/Pap, referral provided. EKG performed in office, unremarkable. Advised to take meloxicam once daily over the next 2 weeks for pain and inflammation of left chest wall. Also advised use of otc lidocaine patch and or ice/heat to the area. Advised to call the office if symptoms worsen or persist.   Colonoscopy up-to-date. Mammogram up-to-date, next due 2024, order provided. Labs reviewed, triglycerides slightly elevated. Advised diet low in saturated fats, increase lean meats, fruits, vegetables. May add fish oil supplement. Return in 1 year for annual physical, sooner if needed. Problem List Items Addressed This Visit     Primary hypertriglyceridemia    Vitamin D deficiency   Other Visit Diagnoses     Annual physical exam    -  Primary    Screening for cervical cancer        Relevant Orders    Ambulatory Referral to Gynecology    Encounter for screening mammogram for breast cancer        Relevant Orders    Mammo screening bilateral w 3d & cad    Intermittent left-sided chest pain        Relevant Orders    POCT ECG (Completed)    Chest wall pain        Relevant Medications    meloxicam (MOBIC) 15 mg tablet          Immunizations and preventive care screenings were discussed with patient today. Appropriate education was printed on patient's after visit summary. Counseling:  Alcohol/drug use: discussed moderation in alcohol intake, the recommendations for healthy alcohol use, and avoidance of illicit drug use. Dental Health: discussed importance of regular tooth brushing, flossing, and dental visits. Exercise: the importance of regular exercise/physical activity was discussed. Recommend exercise 3-5 times per week for at least 30 minutes.      Denies smoking, etoh use rarely, denies drug use. No follow-ups on file. Chief Complaint:     Chief Complaint   Patient presents with   • Physical Exam      History of Present Illness:     Adult Annual Physical   Patient here for a comprehensive physical exam. The patient reports  left sided chest pain under breast that radiates into left side of back under shoulder blade, started 1 week ago, intermittent and lasts about 1 hour per episode and occurs daily, random times throughout the day. Denies pain, numbness and tingling into arms, jaw, neck. . Pain is described as sharp shooting pain followed by a hot sensation. No rash present. Movement does not change the pain. However, pain is worse with palpation. Denies family history of cardiac disease. She admits to family stressors which have been increased over the past few months. This is causing her increased anxiety and some difficulty with sleep. She does not wish to take medication or seek therapy at this time. She will try otc melatonin as needed for sleep and call if no improvement. She has hypertriglyceridemia. Triglycerides elevated slightly on most recent labs at 190. Advised diet low in saturated fats, increase lean meats fruits and vegetables. May add fish oil supplement OTC. She has vitamin D deficiency. Vitamin D level low normal on most recent labs. Has not been taking vitamin D OTC as she ran out, advised to restart. Diet and Physical Activity  Diet/Nutrition: well balanced diet. Exercise: no formal exercise. Depression Screening  PHQ-2/9 Depression Screening         General Health  Sleep: Some difficulty getting to sleep at times due to anxiety and having a lot going on at home. Once asleep, no interruption in sleep. Hearing: normal - bilateral.  Vision:  Starting to have trouble with vision, plans to make eye doctor appointment soon . Dental: regular dental visits.        /GYN Health  Patient is: perimenopausal. Menses are starting to become more sporadic per patient. Last menstrual period: 22     Review of Systems:     Review of Systems   Constitutional: Negative. HENT: Negative. Eyes: Negative. Respiratory: Negative. Cardiovascular:  Positive for chest pain. Gastrointestinal: Negative. Endocrine: Negative. Genitourinary: Negative. Musculoskeletal:  Positive for back pain. Skin: Negative. Allergic/Immunologic: Negative. Neurological: Negative. Hematological: Negative. Psychiatric/Behavioral: Negative. Past Medical History:     Past Medical History:   Diagnosis Date   • History of diverticulitis 2018   • Spontaneous      Last assessed 2017   • Varicella       Past Surgical History:     Past Surgical History:   Procedure Laterality Date   • CHOLECYSTECTOMY     • EXPLORATORY LAPAROTOMY        Social History:     Social History     Socioeconomic History   • Marital status: Legally      Spouse name: None   • Number of children: None   • Years of education: None   • Highest education level: None   Occupational History   • None   Tobacco Use   • Smoking status: Never   • Smokeless tobacco: Never   Vaping Use   • Vaping Use: Never used   Substance and Sexual Activity   • Alcohol use: Yes     Comment:  Occ   • Drug use: No     Comment: birthday only    • Sexual activity: Not Currently     Partners: Male   Other Topics Concern   • None   Social History Narrative   • None     Social Determinants of Health     Financial Resource Strain: Not on file   Food Insecurity: Not on file   Transportation Needs: Not on file   Physical Activity: Not on file   Stress: Not on file   Social Connections: Not on file   Intimate Partner Violence: Not on file   Housing Stability: Not on file      Family History:     Family History   Problem Relation Age of Onset   • No Known Problems Mother    • No Known Problems Father    • No Known Problems Sister    • No Known Problems Sister    • No Known Problems Maternal Grandmother    • No Known Problems Maternal Grandfather    • No Known Problems Paternal Grandmother    • Diabetes Paternal Grandfather    • Prostate cancer Paternal Grandfather    • Cancer Paternal Grandfather         prostate   • No Known Problems Brother    • No Known Problems Brother    • No Known Problems Brother    • No Known Problems Brother    • No Known Problems Brother    • No Known Problems Brother    • No Known Problems Brother    • No Known Problems Brother    • No Known Problems Brother    • No Known Problems Son    • No Known Problems Son    • No Known Problems Son    • Diabetes Maternal Aunt    • Breast cancer Maternal Aunt         doesn't know the age   • No Known Problems Maternal Aunt    • No Known Problems Maternal Aunt    • No Known Problems Paternal Aunt    • No Known Problems Paternal Aunt    • Brain cancer Paternal Uncle       Current Medications:     Current Outpatient Medications   Medication Sig Dispense Refill   • Calcium Carbonate (CALCIUM 500 PO) Take 1 tablet by mouth in the morning     • Cholecalciferol (Vitamin D-3) 25 MCG (1000 UT) CAPS Take 2 capsules by mouth in the morning     • ketoconazole (NIZORAL) 2 % cream Apply topically daily for 14 days 30 g 0   • meloxicam (MOBIC) 15 mg tablet Take 1 tablet (15 mg total) by mouth daily 30 tablet 0     No current facility-administered medications for this visit. Allergies:     No Known Allergies   Physical Exam:     /68 (BP Location: Left arm, Patient Position: Sitting, Cuff Size: Adult)   Pulse 86   Temp 97.8 °F (36.6 °C)   Resp 16   Ht 5' 1" (1.549 m)   Wt 62.9 kg (138 lb 9.6 oz)   SpO2 98%   BMI 26.19 kg/m²     Physical Exam  Vitals and nursing note reviewed. Constitutional:       General: She is not in acute distress. Appearance: Normal appearance. She is not ill-appearing. HENT:      Head: Normocephalic and atraumatic.       Right Ear: Tympanic membrane, ear canal and external ear normal.      Left Ear: Tympanic membrane, ear canal and external ear normal.      Nose: Nose normal.      Mouth/Throat:      Mouth: Mucous membranes are moist.      Pharynx: Oropharynx is clear. Eyes:      General:         Right eye: No discharge. Left eye: No discharge. Conjunctiva/sclera: Conjunctivae normal.      Pupils: Pupils are equal, round, and reactive to light. Cardiovascular:      Rate and Rhythm: Normal rate and regular rhythm. Pulses: Normal pulses. Heart sounds: Normal heart sounds. No murmur heard. Comments: Left anterior chest wall pain reproducible to palpation on exam.  Pulmonary:      Effort: Pulmonary effort is normal.      Breath sounds: Normal breath sounds. Abdominal:      General: Abdomen is flat. Bowel sounds are normal.      Palpations: Abdomen is soft. Musculoskeletal:         General: Normal range of motion. Cervical back: Normal range of motion and neck supple. Right lower leg: No edema. Left lower leg: No edema. Skin:     General: Skin is warm and dry. Neurological:      General: No focal deficit present. Mental Status: She is alert and oriented to person, place, and time. Mental status is at baseline. Psychiatric:         Mood and Affect: Mood normal.         Behavior: Behavior normal.         Thought Content:  Thought content normal.          LAURITA Barriga  [facility]

## 2023-12-04 ENCOUNTER — OFFICE VISIT (OUTPATIENT)
Dept: OBGYN CLINIC | Facility: CLINIC | Age: 53
End: 2023-12-04

## 2023-12-04 VITALS
HEART RATE: 75 BPM | WEIGHT: 135.4 LBS | DIASTOLIC BLOOD PRESSURE: 85 MMHG | HEIGHT: 61 IN | SYSTOLIC BLOOD PRESSURE: 133 MMHG | BODY MASS INDEX: 25.57 KG/M2

## 2023-12-04 DIAGNOSIS — N95.0 PMB (POSTMENOPAUSAL BLEEDING): ICD-10-CM

## 2023-12-04 DIAGNOSIS — Z01.419 WOMEN'S ANNUAL ROUTINE GYNECOLOGICAL EXAMINATION: Primary | ICD-10-CM

## 2023-12-04 DIAGNOSIS — Z12.4 CERVICAL CANCER SCREENING: ICD-10-CM

## 2023-12-04 DIAGNOSIS — Z12.39 ENCOUNTER FOR BREAST CANCER SCREENING USING NON-MAMMOGRAM MODALITY: ICD-10-CM

## 2023-12-04 PROCEDURE — S0610 ANNUAL GYNECOLOGICAL EXAMINA: HCPCS | Performed by: NURSE PRACTITIONER

## 2023-12-04 PROCEDURE — 87624 HPV HI-RISK TYP POOLED RSLT: CPT | Performed by: NURSE PRACTITIONER

## 2023-12-04 PROCEDURE — 88175 CYTOPATH C/V AUTO FLUID REDO: CPT | Performed by: NURSE PRACTITIONER

## 2023-12-04 NOTE — PROGRESS NOTES
ANNUAL GYNECOLOGICAL EXAMINATION    Oskar Fermin is a 46 y.o. female who presents today for annual GYN exam.  Her last pap smear was performed 3/2017 and result was NILM, HR-HPV negative. She reports no history of abnormal pap smears in her past.  Her last mammogram was performed 2023 and result was BI-RADS 1. She had screening colonoscopy performed in  with recommendation to repeat in 5 years. She reports menses as absent for over a year, but then did have two days of bleeding in October and again in November. Patient's last menstrual period was 2023 (approximate). Her general medical history has been reviewed and she reports it as follows:    Past Medical History:   Diagnosis Date    History of diverticulitis 2018    Spontaneous      Last assessed 2017    Varicella      Past Surgical History:   Procedure Laterality Date    CHOLECYSTECTOMY      EXPLORATORY LAPAROTOMY       OB History          3    Para   3    Term   3            AB        Living             SAB        IAB        Ectopic        Multiple        Live Births               Obstetric Comments   SAB 4  3 sons              Social History     Tobacco Use    Smoking status: Never    Smokeless tobacco: Never   Vaping Use    Vaping Use: Never used   Substance Use Topics    Alcohol use: Yes     Comment: Occ    Drug use: No     Social History     Substance and Sexual Activity   Sexual Activity Not Currently    Partners: Male     Cancer-related family history includes Brain cancer in her paternal uncle; Breast cancer in her maternal aunt; Cancer in her paternal grandfather; Prostate cancer in her paternal grandfather.     Current Outpatient Medications   Medication Instructions    Calcium Carbonate (CALCIUM 500 PO) 1 tablet, Oral, Daily    Cholecalciferol (Vitamin D-3) 25 MCG (1000 UT) CAPS 2 capsules, Oral, Daily    ketoconazole (NIZORAL) 2 % cream Topical, Daily    meloxicam (MOBIC) 15 mg, Oral, Daily Review of Systems:  Review of Systems   Constitutional: Negative. Respiratory: Negative. Gastrointestinal: Negative. Genitourinary:  Positive for vaginal bleeding. Physical Exam:  /85 (BP Location: Right arm)   Pulse 75   Ht 5' 1" (1.549 m)   Wt 61.4 kg (135 lb 6.4 oz)   LMP 11/22/2023 (Approximate)   BMI 25.58 kg/m²   Physical Exam  Constitutional:       General: She is not in acute distress. Appearance: Normal appearance. Genitourinary:      Vulva and bladder normal.      No lesions in the vagina. No vaginal erythema or ulceration. Right Adnexa: not tender and no mass present. Left Adnexa: not tender and no mass present. No cervical motion tenderness or lesion. Uterus is not enlarged or tender. No uterine mass detected. Breasts:     Right: No mass, nipple discharge or skin change. Left: No mass, nipple discharge or skin change. Cardiovascular:      Rate and Rhythm: Normal rate and regular rhythm. Pulmonary:      Effort: Pulmonary effort is normal.      Breath sounds: Normal breath sounds. Abdominal:      General: Abdomen is flat. Palpations: Abdomen is soft. Musculoskeletal:      Cervical back: Neck supple. Neurological:      Mental Status: She is alert. Skin:     General: Skin is warm and dry. Psychiatric:         Mood and Affect: Mood normal.         Behavior: Behavior normal.   Vitals reviewed. Assessment/Plan:   1. Normal well-woman GYN exam.  2. Cervical cancer screening:  Normal cervical exam.  Pap smear done with HPV co-testing. 3. STD screening:  Patient declines. 4. Breast cancer screening:  Normal breast exam. Scheduled for bilateral screening mammogram.  Reviewed breast self-awareness. 5. Depression Screening: Patient's depression screening was assessed with a PHQ-2 score of 0. Their PHQ-9 score was 2. Clinically patient does not have depression. No treatment is required.      6. BMI Counseling: Body mass index is 25.58 kg/m². Discussed the patient's BMI with her. The BMI is above normal. Nutrition recommendations include reducing portion sizes, decreasing overall calorie intake, moderation in carbohydrate intake, and increasing intake of lean protein. Exercise recommendations include moderate aerobic physical activity for 150 minutes/week and exercising 3-5 times per week. 7. Orders placed for pelvic US due to PMB. Discussed recommendation for EMB and she is agreeable to this. 8. Return to office for EMB. Reviewed with patient that test results are [facility] immediately, but that they will not necessarily be reviewed by me immediately. Explained that I will review results at my earliest opportunity and contact patient appropriately.

## 2023-12-05 LAB
HPV HR 12 DNA CVX QL NAA+PROBE: NEGATIVE
HPV16 DNA CVX QL NAA+PROBE: NEGATIVE
HPV18 DNA CVX QL NAA+PROBE: NEGATIVE

## 2023-12-07 ENCOUNTER — TELEPHONE (OUTPATIENT)
Dept: OBGYN CLINIC | Facility: CLINIC | Age: 53
End: 2023-12-07

## 2023-12-07 LAB
LAB AP GYN PRIMARY INTERPRETATION: NORMAL
Lab: NORMAL

## 2023-12-07 NOTE — TELEPHONE ENCOUNTER
----- Message from Yady Jameson, [address] [place] [address] sent at 12/7/2023  2:51 PM EST -----  Please let her know the pap smear is normal. Thank you!

## 2023-12-14 ENCOUNTER — PROCEDURE VISIT (OUTPATIENT)
Dept: OBGYN CLINIC | Facility: CLINIC | Age: 53
End: 2023-12-14

## 2023-12-14 VITALS
HEART RATE: 68 BPM | BODY MASS INDEX: 25.53 KG/M2 | HEIGHT: 61 IN | WEIGHT: 135.2 LBS | SYSTOLIC BLOOD PRESSURE: 129 MMHG | DIASTOLIC BLOOD PRESSURE: 88 MMHG

## 2023-12-14 DIAGNOSIS — N95.0 PMB (POSTMENOPAUSAL BLEEDING): Primary | ICD-10-CM

## 2023-12-14 PROCEDURE — 58100 BIOPSY OF UTERUS LINING: CPT | Performed by: OBSTETRICS & GYNECOLOGY

## 2023-12-14 PROCEDURE — 88305 TISSUE EXAM BY PATHOLOGIST: CPT | Performed by: PATHOLOGY

## 2023-12-14 NOTE — PROGRESS NOTES
Endometrial biopsy    Date/Time: 12/14/2023 9:36 AM    Performed by: Nirmala Camacho MD  Authorized by: Nirmala Camacho MD  Universal Protocol:  Consent: Verbal consent obtained. Written consent obtained.   Risks and benefits: risks, benefits and alternatives were discussed  Consent given by: patient  Patient understanding: patient states understanding of the procedure being performed  Patient consent: the patient's understanding of the procedure matches consent given  Procedure consent: procedure consent matches procedure scheduled  Required items: required blood products, implants, devices, and special equipment available  Patient identity confirmed: verbally with patient    Indication:     Indications: Post-menopausal bleeding    Procedure:     Procedure: endometrial biopsy with Pipelle      A bivalve speculum was placed in the vagina: yes      Cervix cleaned and prepped: yes      The cervix was dilated: yes      Uterus sounded: yes      Uterus sound depth (cm):  6    Patient tolerated procedure well with no complications: yes    Findings:     Cervix: normal      Sammy Sousa MD  Obstetrics & Gynecology PGY-3  12/14/2023  9:37 AM

## 2023-12-18 ENCOUNTER — TELEPHONE (OUTPATIENT)
Dept: OBGYN CLINIC | Facility: CLINIC | Age: 53
End: 2023-12-18

## 2023-12-18 NOTE — TELEPHONE ENCOUNTER
Patient called in with her son Duane, asked to see if we schedule and ultrasound for her or if she has to. Informed them she has to schedule it. Asked about the pap results, informed them that it came back normal. Patient and son had questions about the biopsy results, informed them we do not have the results yet and it should be back by the end of this week. Patient and son understood. Patient requested to call her son with the biopsy results due to her not being too understanding when it comes to medical things. Confirmed sons contact information. Patient had no further questions or concerns at this time.

## 2023-12-20 ENCOUNTER — HOSPITAL ENCOUNTER (OUTPATIENT)
Dept: ULTRASOUND IMAGING | Facility: HOSPITAL | Age: 53
Discharge: HOME | End: 2023-12-20
Payer: COMMERCIAL

## 2023-12-20 DIAGNOSIS — N95.0 PMB (POSTMENOPAUSAL BLEEDING): ICD-10-CM

## 2023-12-20 PROCEDURE — 76856 US EXAM PELVIC COMPLETE: CPT

## 2023-12-20 PROCEDURE — 76830 TRANSVAGINAL US NON-OB: CPT

## 2023-12-21 PROCEDURE — 88305 TISSUE EXAM BY PATHOLOGIST: CPT | Performed by: PATHOLOGY

## 2024-02-13 ENCOUNTER — TELEPHONE (OUTPATIENT)
Dept: OBGYN CLINIC | Facility: CLINIC | Age: 54
End: 2024-02-13

## 2024-02-13 NOTE — TELEPHONE ENCOUNTER
----- Message from Suzanna Martínez MD sent at 2/12/2024  2:55 PM EST -----  Please let the patient know that her results (EMB and TVUS) were normal. RTO if bleeding persists    Thank you!  Suaznna  ----- Message -----  From: Interface, Radiology Results In  Sent: 12/27/2023  10:53 AM EST  To: Suzanna Martínez MD

## 2024-02-13 NOTE — TELEPHONE ENCOUNTER
Spoke with patient to advise of normal EMB and US results. Advised to return to office if bleeding persists to schedule appointment in office for follow up. She denied any current bleeding at this time.

## 2024-05-21 ENCOUNTER — HOSPITAL ENCOUNTER (OUTPATIENT)
Dept: MAMMOGRAPHY | Facility: MEDICAL CENTER | Age: 54
Discharge: HOME | End: 2024-05-21
Payer: COMMERCIAL

## 2024-05-21 DIAGNOSIS — Z12.31 ENCOUNTER FOR SCREENING MAMMOGRAM FOR BREAST CANCER: ICD-10-CM

## 2024-05-21 PROCEDURE — 77063 BREAST TOMOSYNTHESIS BI: CPT

## 2024-05-21 PROCEDURE — 77067 SCR MAMMO BI INCL CAD: CPT

## 2024-05-25 ENCOUNTER — DOCUMENTATION (OUTPATIENT)
Dept: FAMILY MEDICINE CLINIC | Facility: MEDICAL CENTER | Age: 54
End: 2024-05-25

## 2024-06-20 DIAGNOSIS — L23.7 POISON IVY: Primary | ICD-10-CM

## 2024-06-20 RX ORDER — CLOBETASOL PROPIONATE 0.5 MG/G
CREAM TOPICAL 2 TIMES DAILY
Qty: 30 G | Refills: 0 | Status: SHIPPED | OUTPATIENT
Start: 2024-06-20

## 2025-02-21 ENCOUNTER — TELEPHONE (OUTPATIENT)
Dept: FAMILY MEDICINE CLINIC | Facility: MEDICAL CENTER | Age: 55
End: 2025-02-21